# Patient Record
Sex: FEMALE | Race: WHITE | NOT HISPANIC OR LATINO | Employment: OTHER | URBAN - NONMETROPOLITAN AREA
[De-identification: names, ages, dates, MRNs, and addresses within clinical notes are randomized per-mention and may not be internally consistent; named-entity substitution may affect disease eponyms.]

---

## 2023-03-02 LAB
ANION GAP IN SER/PLAS: 12 MMOL/L (ref 10–20)
ANION GAP IN SER/PLAS: NORMAL
CALCIUM (MG/DL) IN SER/PLAS: 9.3 MG/DL (ref 8.6–10.3)
CALCIUM (MG/DL) IN SER/PLAS: NORMAL
CARBON DIOXIDE, TOTAL (MMOL/L) IN SER/PLAS: 26 MMOL/L (ref 21–32)
CARBON DIOXIDE, TOTAL (MMOL/L) IN SER/PLAS: NORMAL
CHLORIDE (MMOL/L) IN SER/PLAS: 104 MMOL/L (ref 98–107)
CHLORIDE (MMOL/L) IN SER/PLAS: NORMAL
CREATININE (MG/DL) IN SER/PLAS: 1.01 MG/DL (ref 0.5–1.05)
CREATININE (MG/DL) IN SER/PLAS: NORMAL
GFR FEMALE: 59 ML/MIN/1.73M2
GFR FEMALE: NORMAL
GFR MALE: NORMAL
GLUCOSE (MG/DL) IN SER/PLAS: 108 MG/DL (ref 74–99)
GLUCOSE (MG/DL) IN SER/PLAS: NORMAL
POTASSIUM (MMOL/L) IN SER/PLAS: 4.2 MMOL/L (ref 3.5–5.3)
POTASSIUM (MMOL/L) IN SER/PLAS: NORMAL
SODIUM (MMOL/L) IN SER/PLAS: 138 MMOL/L (ref 136–145)
SODIUM (MMOL/L) IN SER/PLAS: NORMAL
THYROTROPIN (MIU/L) IN SER/PLAS BY DETECTION LIMIT <= 0.05 MIU/L: 2.25 MIU/L (ref 0.44–3.98)
THYROTROPIN (MIU/L) IN SER/PLAS BY DETECTION LIMIT <= 0.05 MIU/L: NORMAL
THYROXINE (T4) FREE (NG/DL) IN SER/PLAS: 0.84 NG/DL (ref 0.61–1.12)
THYROXINE (T4) FREE (NG/DL) IN SER/PLAS: NORMAL
UREA NITROGEN (MG/DL) IN SER/PLAS: 25 MG/DL (ref 6–23)
UREA NITROGEN (MG/DL) IN SER/PLAS: NORMAL

## 2023-08-31 LAB
ALANINE AMINOTRANSFERASE (SGPT) (U/L) IN SER/PLAS: 30 U/L (ref 7–45)
ALBUMIN (G/DL) IN SER/PLAS: 4.3 G/DL (ref 3.4–5)
ALKALINE PHOSPHATASE (U/L) IN SER/PLAS: 110 U/L (ref 33–136)
ANION GAP IN SER/PLAS: 14 MMOL/L (ref 10–20)
ASPARTATE AMINOTRANSFERASE (SGOT) (U/L) IN SER/PLAS: 28 U/L (ref 9–39)
BILIRUBIN TOTAL (MG/DL) IN SER/PLAS: 0.4 MG/DL (ref 0–1.2)
CALCIUM (MG/DL) IN SER/PLAS: 9.3 MG/DL (ref 8.6–10.3)
CARBON DIOXIDE, TOTAL (MMOL/L) IN SER/PLAS: 24 MMOL/L (ref 21–32)
CHLORIDE (MMOL/L) IN SER/PLAS: 103 MMOL/L (ref 98–107)
CHOLESTEROL (MG/DL) IN SER/PLAS: 159 MG/DL (ref 0–199)
CHOLESTEROL IN HDL (MG/DL) IN SER/PLAS: 55 MG/DL
CHOLESTEROL/HDL RATIO: 2.9
CREATININE (MG/DL) IN SER/PLAS: 0.89 MG/DL (ref 0.5–1.05)
ERYTHROCYTE DISTRIBUTION WIDTH (RATIO) BY AUTOMATED COUNT: 16.6 % (ref 11.5–14.5)
ERYTHROCYTE MEAN CORPUSCULAR HEMOGLOBIN CONCENTRATION (G/DL) BY AUTOMATED: 30.9 G/DL (ref 32–36)
ERYTHROCYTE MEAN CORPUSCULAR VOLUME (FL) BY AUTOMATED COUNT: 87 FL (ref 80–100)
ERYTHROCYTES (10*6/UL) IN BLOOD BY AUTOMATED COUNT: 4.53 X10E12/L (ref 4–5.2)
ESTIMATED AVERAGE GLUCOSE FOR HBA1C: 131 MG/DL
GFR FEMALE: 68 ML/MIN/1.73M2
GLUCOSE (MG/DL) IN SER/PLAS: 105 MG/DL (ref 74–99)
HEMATOCRIT (%) IN BLOOD BY AUTOMATED COUNT: 39.5 % (ref 36–46)
HEMOGLOBIN (G/DL) IN BLOOD: 12.2 G/DL (ref 12–16)
HEMOGLOBIN A1C/HEMOGLOBIN TOTAL IN BLOOD: 6.2 %
LDL: 89 MG/DL (ref 0–99)
LEUKOCYTES (10*3/UL) IN BLOOD BY AUTOMATED COUNT: 8 X10E9/L (ref 4.4–11.3)
PLATELETS (10*3/UL) IN BLOOD AUTOMATED COUNT: 196 X10E9/L (ref 150–450)
POTASSIUM (MMOL/L) IN SER/PLAS: 4 MMOL/L (ref 3.5–5.3)
PROTEIN TOTAL: 7 G/DL (ref 6.4–8.2)
SODIUM (MMOL/L) IN SER/PLAS: 137 MMOL/L (ref 136–145)
THYROTROPIN (MIU/L) IN SER/PLAS BY DETECTION LIMIT <= 0.05 MIU/L: 4.14 MIU/L (ref 0.44–3.98)
THYROXINE (T4) FREE (NG/DL) IN SER/PLAS: 0.83 NG/DL (ref 0.61–1.12)
TRIGLYCERIDE (MG/DL) IN SER/PLAS: 74 MG/DL (ref 0–149)
UREA NITROGEN (MG/DL) IN SER/PLAS: 26 MG/DL (ref 6–23)
VLDL: 15 MG/DL (ref 0–40)

## 2023-09-01 PROBLEM — R09.81 NASAL CONGESTION: Status: ACTIVE | Noted: 2023-09-01

## 2023-09-01 PROBLEM — R21 SKIN RASH: Status: ACTIVE | Noted: 2023-09-01

## 2023-09-01 PROBLEM — R53.83 FATIGUE: Status: ACTIVE | Noted: 2023-09-01

## 2023-09-01 PROBLEM — R55 NEAR SYNCOPE: Status: ACTIVE | Noted: 2023-09-01

## 2023-09-01 PROBLEM — R10.13 DYSPEPSIA: Status: ACTIVE | Noted: 2023-09-01

## 2023-09-01 PROBLEM — E78.5 HYPERLIPIDEMIA: Status: ACTIVE | Noted: 2023-09-01

## 2023-09-01 PROBLEM — J04.0 LARYNGITIS: Status: ACTIVE | Noted: 2023-09-01

## 2023-09-01 PROBLEM — M51.37 DDD (DEGENERATIVE DISC DISEASE), LUMBOSACRAL: Status: ACTIVE | Noted: 2023-09-01

## 2023-09-01 PROBLEM — J01.40 ACUTE NON-RECURRENT PANSINUSITIS: Status: ACTIVE | Noted: 2023-09-01

## 2023-09-01 PROBLEM — R19.7 DIARRHEA: Status: ACTIVE | Noted: 2023-09-01

## 2023-09-01 PROBLEM — G47.33 OBSTRUCTIVE SLEEP APNEA, ADULT: Status: ACTIVE | Noted: 2023-09-01

## 2023-09-01 PROBLEM — N90.7 INCLUSION CYST OF VULVA: Status: ACTIVE | Noted: 2023-09-01

## 2023-09-01 PROBLEM — L71.9 ROSACEA: Status: ACTIVE | Noted: 2023-09-01

## 2023-09-01 PROBLEM — R42 DIZZINESS: Status: ACTIVE | Noted: 2023-09-01

## 2023-09-01 PROBLEM — R10.9 ABDOMINAL PAIN: Status: ACTIVE | Noted: 2023-09-01

## 2023-09-01 PROBLEM — I10 BENIGN ESSENTIAL HTN: Status: ACTIVE | Noted: 2023-09-01

## 2023-09-01 PROBLEM — Z86.39 H/O IRON DEFICIENCY: Status: ACTIVE | Noted: 2023-09-01

## 2023-09-01 PROBLEM — N64.59 BREAST THICKENING: Status: ACTIVE | Noted: 2023-09-01

## 2023-09-01 PROBLEM — F51.04 CHRONIC INSOMNIA: Status: ACTIVE | Noted: 2023-09-01

## 2023-09-01 PROBLEM — F41.9 ANXIETY: Status: ACTIVE | Noted: 2023-09-01

## 2023-09-01 PROBLEM — M51.379 DDD (DEGENERATIVE DISC DISEASE), LUMBOSACRAL: Status: ACTIVE | Noted: 2023-09-01

## 2023-09-01 PROBLEM — R05.9 COUGH: Status: ACTIVE | Noted: 2023-09-01

## 2023-09-01 PROBLEM — K21.9 GASTROESOPHAGEAL REFLUX DISEASE WITHOUT ESOPHAGITIS: Status: ACTIVE | Noted: 2023-09-01

## 2023-09-01 PROBLEM — B37.0 THRUSH: Status: ACTIVE | Noted: 2023-09-01

## 2023-09-01 PROBLEM — M16.0 PRIMARY OSTEOARTHRITIS OF BOTH HIPS: Status: ACTIVE | Noted: 2023-09-01

## 2023-09-01 PROBLEM — E66.9 OBESITY: Status: ACTIVE | Noted: 2023-09-01

## 2023-09-01 PROBLEM — N90.89 VULVAR LESION: Status: ACTIVE | Noted: 2023-09-01

## 2023-09-01 PROBLEM — M25.512 CHRONIC LEFT SHOULDER PAIN: Status: ACTIVE | Noted: 2023-09-01

## 2023-09-01 PROBLEM — G89.29 CHRONIC LEFT SHOULDER PAIN: Status: ACTIVE | Noted: 2023-09-01

## 2023-09-01 PROBLEM — I25.10 CORONARY ARTERY DISEASE INVOLVING NATIVE CORONARY ARTERY OF NATIVE HEART WITHOUT ANGINA PECTORIS: Status: ACTIVE | Noted: 2023-09-01

## 2023-09-01 RX ORDER — BUPROPION HYDROCHLORIDE 150 MG/1
1 TABLET ORAL DAILY
COMMUNITY
Start: 2022-08-29 | End: 2023-09-08 | Stop reason: ALTCHOICE

## 2023-09-01 RX ORDER — MOMETASONE FUROATE 1 MG/G
CREAM TOPICAL
COMMUNITY
Start: 2020-08-20

## 2023-09-01 RX ORDER — ALBUTEROL SULFATE 90 UG/1
2 AEROSOL, METERED RESPIRATORY (INHALATION) 4 TIMES DAILY
COMMUNITY
End: 2024-03-08 | Stop reason: SDUPTHER

## 2023-09-01 RX ORDER — FLUCONAZOLE 200 MG/1
1 TABLET ORAL DAILY
COMMUNITY
Start: 2022-10-11 | End: 2023-09-08 | Stop reason: ALTCHOICE

## 2023-09-01 RX ORDER — ATENOLOL 50 MG/1
1 TABLET ORAL DAILY
COMMUNITY
End: 2023-10-31

## 2023-09-01 RX ORDER — FLUTICASONE PROPIONATE 50 MCG
1 SPRAY, SUSPENSION (ML) NASAL DAILY
COMMUNITY
Start: 2022-01-31 | End: 2023-12-22 | Stop reason: SDUPTHER

## 2023-09-01 RX ORDER — OMEPRAZOLE 20 MG/1
1 CAPSULE, DELAYED RELEASE ORAL DAILY
COMMUNITY
Start: 2020-02-10 | End: 2023-10-31

## 2023-09-01 RX ORDER — SIMVASTATIN 40 MG/1
1 TABLET, FILM COATED ORAL NIGHTLY
COMMUNITY
Start: 2019-10-08 | End: 2023-10-31

## 2023-09-05 ENCOUNTER — APPOINTMENT (OUTPATIENT)
Dept: PRIMARY CARE | Facility: CLINIC | Age: 73
End: 2023-09-05
Payer: MEDICARE

## 2023-09-08 ENCOUNTER — OFFICE VISIT (OUTPATIENT)
Dept: PRIMARY CARE | Facility: CLINIC | Age: 73
End: 2023-09-08
Payer: MEDICARE

## 2023-09-08 VITALS
SYSTOLIC BLOOD PRESSURE: 124 MMHG | BODY MASS INDEX: 43.56 KG/M2 | DIASTOLIC BLOOD PRESSURE: 82 MMHG | OXYGEN SATURATION: 96 % | HEIGHT: 59 IN | HEART RATE: 67 BPM | WEIGHT: 216.1 LBS

## 2023-09-08 DIAGNOSIS — E66.01 MORBID (SEVERE) OBESITY DUE TO EXCESS CALORIES (MULTI): ICD-10-CM

## 2023-09-08 DIAGNOSIS — E78.2 MIXED HYPERLIPIDEMIA: ICD-10-CM

## 2023-09-08 DIAGNOSIS — I25.119 ATHEROSCLEROSIS OF NATIVE CORONARY ARTERY OF NATIVE HEART WITH ANGINA PECTORIS (CMS-HCC): ICD-10-CM

## 2023-09-08 DIAGNOSIS — R19.7 DIARRHEA, UNSPECIFIED TYPE: ICD-10-CM

## 2023-09-08 DIAGNOSIS — M51.37 DDD (DEGENERATIVE DISC DISEASE), LUMBOSACRAL: ICD-10-CM

## 2023-09-08 DIAGNOSIS — K21.9 GASTROESOPHAGEAL REFLUX DISEASE WITHOUT ESOPHAGITIS: ICD-10-CM

## 2023-09-08 DIAGNOSIS — R73.02 IGT (IMPAIRED GLUCOSE TOLERANCE): ICD-10-CM

## 2023-09-08 DIAGNOSIS — I10 BENIGN ESSENTIAL HTN: ICD-10-CM

## 2023-09-08 DIAGNOSIS — Z00.00 ROUTINE GENERAL MEDICAL EXAMINATION AT HEALTH CARE FACILITY: Primary | ICD-10-CM

## 2023-09-08 DIAGNOSIS — I25.10 CORONARY ARTERY DISEASE INVOLVING NATIVE CORONARY ARTERY OF NATIVE HEART WITHOUT ANGINA PECTORIS: ICD-10-CM

## 2023-09-08 PROCEDURE — 3079F DIAST BP 80-89 MM HG: CPT | Performed by: FAMILY MEDICINE

## 2023-09-08 PROCEDURE — 1159F MED LIST DOCD IN RCRD: CPT | Performed by: FAMILY MEDICINE

## 2023-09-08 PROCEDURE — 1126F AMNT PAIN NOTED NONE PRSNT: CPT | Performed by: FAMILY MEDICINE

## 2023-09-08 PROCEDURE — 3074F SYST BP LT 130 MM HG: CPT | Performed by: FAMILY MEDICINE

## 2023-09-08 PROCEDURE — 1170F FXNL STATUS ASSESSED: CPT | Performed by: FAMILY MEDICINE

## 2023-09-08 PROCEDURE — 1036F TOBACCO NON-USER: CPT | Performed by: FAMILY MEDICINE

## 2023-09-08 PROCEDURE — G0439 PPPS, SUBSEQ VISIT: HCPCS | Performed by: FAMILY MEDICINE

## 2023-09-08 PROCEDURE — 1160F RVW MEDS BY RX/DR IN RCRD: CPT | Performed by: FAMILY MEDICINE

## 2023-09-08 PROCEDURE — 99214 OFFICE O/P EST MOD 30 MIN: CPT | Performed by: FAMILY MEDICINE

## 2023-09-08 RX ORDER — MINERAL OIL
1 ENEMA (ML) RECTAL DAILY
COMMUNITY

## 2023-09-08 RX ORDER — ZINC GLUCONATE 100 MG
1 TABLET ORAL DAILY
COMMUNITY

## 2023-09-08 RX ORDER — FLUOXETINE 10 MG/1
CAPSULE ORAL
COMMUNITY
Start: 2023-08-30

## 2023-09-08 RX ORDER — FLUOXETINE HYDROCHLORIDE 20 MG/1
1 CAPSULE ORAL DAILY
COMMUNITY
Start: 2023-03-02

## 2023-09-08 RX ORDER — FLUOXETINE HYDROCHLORIDE 40 MG/1
CAPSULE ORAL
COMMUNITY
End: 2023-10-31

## 2023-09-08 RX ORDER — LANOLIN ALCOHOL/MO/W.PET/CERES
100 CREAM (GRAM) TOPICAL DAILY
COMMUNITY

## 2023-09-08 RX ORDER — DIPHENOXYLATE HYDROCHLORIDE AND ATROPINE SULFATE 2.5; .025 MG/1; MG/1
1 TABLET ORAL 4 TIMES DAILY PRN
Qty: 30 TABLET | Refills: 0 | Status: SHIPPED | OUTPATIENT
Start: 2023-09-08 | End: 2023-10-08

## 2023-09-08 ASSESSMENT — ENCOUNTER SYMPTOMS
OCCASIONAL FEELINGS OF UNSTEADINESS: 0
LOSS OF SENSATION IN FEET: 0
DEPRESSION: 0

## 2023-09-08 ASSESSMENT — ACTIVITIES OF DAILY LIVING (ADL)
DRESSING: INDEPENDENT
DOING_HOUSEWORK: INDEPENDENT
MANAGING_FINANCES: INDEPENDENT
BATHING: INDEPENDENT
GROCERY_SHOPPING: INDEPENDENT
TAKING_MEDICATION: INDEPENDENT

## 2023-09-08 NOTE — PROGRESS NOTES
"Subjective   Reason for Visit: Katrin Garcia is an 73 y.o. female here for a Medicare Wellness visit.     Past Medical, Surgical, and Family History reviewed and updated in chart.    Reviewed all medications by prescribing practitioner or clinical pharmacist (such as prescriptions, OTCs, herbal therapies and supplements) and documented in the medical record.    HPI  Since the last office visit there have been no interval operations, hospitalizations, important illnesses or injuries.  Struggling woth neck and back, doing better woth brace and exercises.  Now knee flared  HTN-Takes and tolerates meds without side effects. No alcohol. no tobacco. no exercise. low salt.  Reviewed recommendation for 150 minutes of exercise per week including 2 days of weight training if over age 50  Coronary artery disease-denies angina  Alb 4x month  IGT continue to monitor  On 70mg nprozac and helpful for anx and dep, mary    Hyperlipidemia- is on a statin and a prudent diet.  GERD-Takes PPI daily with no breakthrough symptoms.  Reviewed dietary, caffeine, tobacco, alcohol, and NSAID avoidance. No dyspepsia, dysphagia, reflux, melena, or abdominal pain.  Diarrhea-states she would like to have some Lomotil for as needed use [place]ARRS report was checked no contract required  Patient Care Team:  Jorge Sesay MD as PCP - General  Jorge Sesay MD as PCP - [employer] PCP     Review of Systems  General-no fatigue weight to within 10 pounds  ENT no problems with vision swallowing  Cardiac no chest pains palpitations change in exercise tolerance or capacity  Pulmonary no cough shortness of breath  GI no heartburn or abdominal pain  Musculoskeletal no joint pains  Objective   Vitals:  /82   Pulse 67   Ht 1.499 m (4' 11\")   Wt 98 kg (216 lb 1.6 oz)   SpO2 96%   BMI 43.65 kg/m²       Physical Exam  General:  Alert, No acute distress. Appears stated age  Eye:  Pupils are equal, round and reactive to light, " Extraocular movements are intact, Normal conjunctiva.    Neck:  Supple, Non-tender, No carotid bruit, No jugular venous distention, No lymphadenopathy, No thyromegaly.    Respiratory:  Lungs are clear to auscultation, Respirations are non-labored, Breath sounds are equal.    Cardiovascular:  Normal rate, Regular rhythm, No murmur.    Gastrointestinal:  Soft, Non-tender, No organomegaly. No solid or pulsatile mass  Integumentary:  Warm, Dry. No concerning lesions on exposed areas  Neurologic:  Alert, Oriented.  Gross and fine motor intact, CN 2-12 intact  Psychiatric:  Cooperative, Appropriate mood & affect.  Assessment/Plan   Problem List Items Addressed This Visit       Benign essential HTN    Relevant Orders    CBC    Comprehensive Metabolic Panel    Follow Up In Primary Care    Coronary artery disease involving native coronary artery of native heart without angina pectoris    Relevant Orders    Follow Up In Primary Care    DDD (degenerative disc disease), lumbosacral    Diarrhea    Relevant Medications    diphenoxylate-atropine (Lomotil) 2.5-0.025 mg tablet    Gastroesophageal reflux disease without esophagitis    Hyperlipidemia    Relevant Orders    Follow Up In Primary Care     Other Visit Diagnoses       Routine general medical examination at health care facility    -  Primary    IGT (impaired glucose tolerance)        Relevant Orders    Hemoglobin A1C

## 2023-10-31 DIAGNOSIS — F41.9 ANXIETY: Primary | ICD-10-CM

## 2023-10-31 DIAGNOSIS — K21.9 GASTROESOPHAGEAL REFLUX DISEASE WITHOUT ESOPHAGITIS: ICD-10-CM

## 2023-10-31 DIAGNOSIS — E78.2 MIXED HYPERLIPIDEMIA: ICD-10-CM

## 2023-10-31 DIAGNOSIS — I10 BENIGN ESSENTIAL HTN: ICD-10-CM

## 2023-10-31 RX ORDER — ATENOLOL 50 MG/1
50 TABLET ORAL DAILY
Qty: 90 TABLET | Refills: 2 | Status: SHIPPED | OUTPATIENT
Start: 2023-10-31

## 2023-10-31 RX ORDER — OMEPRAZOLE 20 MG/1
20 CAPSULE, DELAYED RELEASE ORAL DAILY
Qty: 90 CAPSULE | Refills: 2 | Status: SHIPPED | OUTPATIENT
Start: 2023-10-31

## 2023-10-31 RX ORDER — SIMVASTATIN 40 MG/1
40 TABLET, FILM COATED ORAL NIGHTLY
Qty: 90 TABLET | Refills: 2 | Status: SHIPPED | OUTPATIENT
Start: 2023-10-31

## 2023-10-31 RX ORDER — FLUOXETINE HYDROCHLORIDE 40 MG/1
40 CAPSULE ORAL DAILY
Qty: 90 CAPSULE | Refills: 2 | Status: SHIPPED | OUTPATIENT
Start: 2023-10-31

## 2023-12-22 ENCOUNTER — APPOINTMENT (OUTPATIENT)
Dept: PRIMARY CARE | Facility: CLINIC | Age: 73
End: 2023-12-22
Payer: MEDICARE

## 2023-12-22 DIAGNOSIS — R09.81 NASAL CONGESTION: ICD-10-CM

## 2023-12-22 RX ORDER — FLUTICASONE PROPIONATE 50 MCG
1 SPRAY, SUSPENSION (ML) NASAL DAILY
Qty: 16 G | Refills: 3 | Status: SHIPPED | OUTPATIENT
Start: 2023-12-22 | End: 2024-12-21

## 2023-12-27 ENCOUNTER — OFFICE VISIT (OUTPATIENT)
Dept: URGENT CARE | Facility: CLINIC | Age: 73
End: 2023-12-27
Payer: MEDICARE

## 2023-12-27 VITALS
OXYGEN SATURATION: 95 % | BODY MASS INDEX: 42.33 KG/M2 | DIASTOLIC BLOOD PRESSURE: 84 MMHG | TEMPERATURE: 98.3 F | HEIGHT: 59 IN | RESPIRATION RATE: 14 BRPM | WEIGHT: 210 LBS | SYSTOLIC BLOOD PRESSURE: 129 MMHG | HEART RATE: 64 BPM

## 2023-12-27 DIAGNOSIS — J20.9 ACUTE BRONCHITIS, UNSPECIFIED ORGANISM: Primary | ICD-10-CM

## 2023-12-27 PROCEDURE — 99213 OFFICE O/P EST LOW 20 MIN: CPT | Mod: 25 | Performed by: NURSE PRACTITIONER

## 2023-12-27 RX ORDER — PREDNISONE 10 MG/1
TABLET ORAL
Qty: 21 TABLET | Refills: 0 | Status: SHIPPED | OUTPATIENT
Start: 2023-12-27 | End: 2024-01-01

## 2023-12-27 RX ORDER — AZITHROMYCIN 250 MG/1
TABLET, FILM COATED ORAL
Qty: 6 TABLET | Refills: 0 | Status: SHIPPED | OUTPATIENT
Start: 2023-12-27 | End: 2024-01-01

## 2023-12-27 RX ORDER — ALBUTEROL SULFATE 90 UG/1
2 AEROSOL, METERED RESPIRATORY (INHALATION) EVERY 6 HOURS PRN
Qty: 18 G | Refills: 0 | Status: SHIPPED | OUTPATIENT
Start: 2023-12-27 | End: 2024-12-26

## 2023-12-27 NOTE — PROGRESS NOTES
73 y.o. female presents for evaluation of URI.  Symptoms including cough, congestion, body aches, malaise, and headache have been present for several days and refractory to OTC meds.  No fever, chills, nausea, vomiting, abdominal pain, CP, or SOB.  No exacerbating factors. Tested positive for COVID 1 week ago and retested negative a few days ago.      Vitals:    12/27/23 1132   BP: 129/84   Pulse: 64   Resp: 14   Temp: 36.8 °C (98.3 °F)   SpO2: 95%       Allergies   Allergen Reactions    Bupropion Hcl Other     DEPRESSION    Codeine Anaphylaxis    Adhesive Tape-Silicones Swelling    Atorvastatin Other     MYALGIA    Camphor-Eucalyptus Oil-Menthol Unknown    Clindamycin Unknown    Erythromycin Base Unknown    Moxifloxacin Other     VOMITING    Nut - Unspecified Unknown    Pineapple Swelling    Procaine Unknown    Tetanus Toxoid Swelling    Tetanus Toxoid, Adsorbed Other    Latex Other and Rash       Medication Documentation Review Audit       Reviewed by Lakshmi Joy MA (Medical Assistant) on 12/27/23 at 1134      Medication Order Taking? Sig Documenting Provider Last Dose Status   albuterol 90 mcg/actuation inhaler [id number] Yes Inhale 2 puffs 4 times a day. Historical Provider, MD Taking Active   atenolol (Tenormin) 50 mg tablet [id number] Yes take 1 tablet by mouth once daily Jorge Sesay MD Taking Active   cholecalciferol, vitamin D3, (VITAMIN D3 ORAL) [id number] Yes Take by mouth. Historical Provider, MD Taking Active   cyanocobalamin (Vitamin B-12) 1,000 mcg tablet [id number] Yes Take 100 mcg by mouth once daily. Historical Provider, MD Taking Active   fexofenadine (Allegra Allergy) 180 mg tablet [id number] Yes Take 1 tablet (180 mg) by mouth once daily. Historical Provider, MD Taking Active   FLUoxetine (PROzac) 10 mg capsule [id number] Yes take 1 capsule by mouth once daily take with 40 milligram and 20 ...  (REFER TO PRESCRIPTION NOTES). Historical Provider, MD Taking Active   FLUoxetine (PROzac) 20 mg  capsule [id number] Yes Take 1 capsule (20 mg) by mouth once daily. Historical Provider, MD Taking Active   FLUoxetine (PROzac) 40 mg capsule [id number] Yes take 1 capsule by mouth once daily Jorge Sesay MD Taking Active     Discontinued 12/22/23 1237   fluticasone (Flonase) 50 mcg/actuation nasal spray [id number] Yes Administer 1 spray into each nostril once daily. Jorge Sesay MD Taking Active   IODINE ORAL [id number] Yes Take by mouth. Historical Provider, MD Taking Active   mometasone (Elocon) 0.1 % cream [id number] Yes Apply topically once daily. APPLY SPARINGLY TO AFFECTED AREA(S) ONCE DAILY Historical Provider, MD Taking Active   omeprazole (PriLOSEC) 20 mg DR capsule [id number] Yes take 1 capsule by mouth once daily Jorge Sesay MD Taking Active   phytonadione, vit K1, (vitamin k) 100 mcg tablet [id number] Yes Take 1 tablet (100 mcg) by mouth once daily. Historical Provider, MD Taking Active   simvastatin (Zocor) 40 mg tablet [id number] Yes take 1 tablet by mouth at bedtime Jorge Sesay MD Taking Active                    Past Medical History:   Diagnosis Date    Encounter for gynecological examination (general) (routine) without abnormal findings     Women's annual routine gynecological examination    Encounter for screening for malignant neoplasm of vagina 08/03/2020    Vaginal Pap smear    Other conditions influencing health status     Menarche    Personal history of other complications of pregnancy, childbirth and the puerperium     History of ectopic pregnancy    Personal history of other diseases of the nervous system and sense organs     History of sleep apnea       Past Surgical History:   Procedure Laterality Date    OTHER SURGICAL HISTORY  01/06/2020    Kidney surgery    OTHER SURGICAL HISTORY  01/06/2020    Hand surgery    OTHER SURGICAL HISTORY  01/06/2020    Colonoscopy    OTHER SURGICAL HISTORY  01/06/2020    Ankle surgery    OTHER SURGICAL HISTORY  01/06/2020    Cholecystectomy     OTHER SURGICAL HISTORY  01/06/2020    Appendectomy    OTHER SURGICAL HISTORY  01/06/2020    Tonsillectomy    OTHER SURGICAL HISTORY  08/03/2020    Hysterectomy       ROS  See HPI    Physical Exam  Vitals and nursing note reviewed.   Constitutional:       Appearance: She is ill-appearing (mildly).   HENT:      Head: Normocephalic and atraumatic.      Right Ear: Tympanic membrane and ear canal normal.      Left Ear: Tympanic membrane and ear canal normal.      Nose: Congestion present.      Mouth/Throat:      Mouth: Mucous membranes are moist.      Pharynx: Oropharynx is clear.   Eyes:      Extraocular Movements: Extraocular movements intact.      Conjunctiva/sclera: Conjunctivae normal.      Pupils: Pupils are equal, round, and reactive to light.   Cardiovascular:      Rate and Rhythm: Normal rate and regular rhythm.      Heart sounds: Normal heart sounds.   Pulmonary:      Effort: Pulmonary effort is normal.      Breath sounds: Normal breath sounds.      Comments: Dry cough during exam  Lymphadenopathy:      Cervical: No cervical adenopathy.   Skin:     General: Skin is warm and dry.      Capillary Refill: Capillary refill takes less than 2 seconds.   Neurological:      General: No focal deficit present.      Mental Status: She is alert and oriented to person, place, and time.   Psychiatric:         Mood and Affect: Mood normal.         Behavior: Behavior normal.         Assessment/Plan/MDM  Katrin was seen today for uri.  Diagnoses and all orders for this visit:  Acute bronchitis, unspecified organism (Primary)  -     azithromycin (Zithromax Z-Bobby) 250 mg tablet; Take 2 tablets (500 mg) on  Day 1,  followed by 1 tablet (250 mg) once daily on Days 2 through 5.  -     predniSONE (Deltasone) 10 mg tablet; Take 6 tablets (60 mg) by mouth once daily for 1 day, THEN 5 tablets (50 mg) once daily for 1 day, THEN 4 tablets (40 mg) once daily for 1 day, THEN 3 tablets (30 mg) once daily for 1 day, THEN 2 tablets (20 mg)  once daily for 1 day, THEN 1 tablet (10 mg) once daily for 1 day.  -     albuterol 90 mcg/actuation inhaler; Inhale 2 puffs every 6 hours if needed for wheezing.    Patient's clinical presentation is otherwise unremarkable at this time. Patient is discharged with instructions to follow-up with primary care or seek emergency medical attention for worsening symptoms or any new concerns.    Dominic Ross CNP  [facility]  [phone]   Father/EMS

## 2024-02-08 ENCOUNTER — OFFICE VISIT (OUTPATIENT)
Dept: OBSTETRICS AND GYNECOLOGY | Facility: CLINIC | Age: 74
End: 2024-02-08
Payer: MEDICARE

## 2024-02-08 VITALS
SYSTOLIC BLOOD PRESSURE: 128 MMHG | BODY MASS INDEX: 45.93 KG/M2 | HEIGHT: 58 IN | WEIGHT: 218.8 LBS | DIASTOLIC BLOOD PRESSURE: 74 MMHG

## 2024-02-08 DIAGNOSIS — Z12.31 ENCOUNTER FOR SCREENING MAMMOGRAM FOR MALIGNANT NEOPLASM OF BREAST: ICD-10-CM

## 2024-02-08 PROCEDURE — 1126F AMNT PAIN NOTED NONE PRSNT: CPT | Performed by: REGISTERED NURSE

## 2024-02-08 PROCEDURE — 99397 PER PM REEVAL EST PAT 65+ YR: CPT | Performed by: REGISTERED NURSE

## 2024-02-08 PROCEDURE — 3078F DIAST BP <80 MM HG: CPT | Performed by: REGISTERED NURSE

## 2024-02-08 PROCEDURE — 3074F SYST BP LT 130 MM HG: CPT | Performed by: REGISTERED NURSE

## 2024-02-08 PROCEDURE — 1159F MED LIST DOCD IN RCRD: CPT | Performed by: REGISTERED NURSE

## 2024-02-08 PROCEDURE — 1036F TOBACCO NON-USER: CPT | Performed by: REGISTERED NURSE

## 2024-02-08 ASSESSMENT — ENCOUNTER SYMPTOMS
PSYCHIATRIC NEGATIVE: 1
CONSTITUTIONAL NEGATIVE: 1

## 2024-02-08 NOTE — PROGRESS NOTES
Subjective   Patient ID: Katrin Garcia is a 73 y.o. female who presents for Gynecologic Exam.  Gynecologic Exam    Pt. Presents for annual exam. 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 Pt. Does regular vulvar self-exam. Due for mammogram. Reports feeling much better due to decreased anxiety symptoms with increased dose of fluoxetine per PCP. Denies any complaints or concerns. 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     Review of Systems   Constitutional: Negative.    Genitourinary: Negative.    Psychiatric/Behavioral: Negative.         Objective   Physical Exam  Constitutional:       Appearance: Normal appearance.   Pulmonary:      Effort: Pulmonary effort is normal.   Neurological:      General: No focal deficit present.      Mental Status: She is alert and oriented to person, place, and time.   Psychiatric:         Mood and Affect: Mood normal.         Behavior: Behavior normal.         Assessment/Plan        Schedule mammogram  RTO annual exam and PRN    Gisela Sneed, APRN-CNM, APRN-CNP, DNP 02/08/24 10:26 AM

## 2024-02-13 ENCOUNTER — HOSPITAL ENCOUNTER (OUTPATIENT)
Dept: RADIOLOGY | Facility: HOSPITAL | Age: 74
Discharge: HOME | End: 2024-02-13
Payer: MEDICARE

## 2024-02-13 DIAGNOSIS — Z12.31 ENCOUNTER FOR SCREENING MAMMOGRAM FOR MALIGNANT NEOPLASM OF BREAST: ICD-10-CM

## 2024-02-13 PROCEDURE — 77063 BREAST TOMOSYNTHESIS BI: CPT

## 2024-02-13 PROCEDURE — 77063 BREAST TOMOSYNTHESIS BI: CPT | Performed by: RADIOLOGY

## 2024-02-13 PROCEDURE — 77067 SCR MAMMO BI INCL CAD: CPT | Performed by: RADIOLOGY

## 2024-02-19 ENCOUNTER — OFFICE VISIT (OUTPATIENT)
Dept: URGENT CARE | Facility: CLINIC | Age: 74
End: 2024-02-19
Payer: MEDICARE

## 2024-02-19 VITALS
DIASTOLIC BLOOD PRESSURE: 71 MMHG | SYSTOLIC BLOOD PRESSURE: 127 MMHG | WEIGHT: 212 LBS | BODY MASS INDEX: 44.5 KG/M2 | HEIGHT: 58 IN | RESPIRATION RATE: 16 BRPM | OXYGEN SATURATION: 97 % | HEART RATE: 67 BPM | TEMPERATURE: 98.6 F

## 2024-02-19 DIAGNOSIS — J01.00 ACUTE NON-RECURRENT MAXILLARY SINUSITIS: Primary | ICD-10-CM

## 2024-02-19 PROCEDURE — 99213 OFFICE O/P EST LOW 20 MIN: CPT

## 2024-02-19 RX ORDER — TIZANIDINE 4 MG/1
4 TABLET ORAL EVERY 8 HOURS PRN
Qty: 30 TABLET | Refills: 0 | Status: SHIPPED | OUTPATIENT
Start: 2024-02-19 | End: 2024-02-19 | Stop reason: ENTERED-IN-ERROR

## 2024-02-19 RX ORDER — AZITHROMYCIN 250 MG/1
TABLET, FILM COATED ORAL
Qty: 6 TABLET | Refills: 0 | Status: SHIPPED | OUTPATIENT
Start: 2024-02-19 | End: 2024-02-24

## 2024-02-19 RX ORDER — DICLOFENAC SODIUM 10 MG/G
4 GEL TOPICAL 4 TIMES DAILY PRN
Qty: 100 G | Refills: 0 | Status: SHIPPED | OUTPATIENT
Start: 2024-02-19 | End: 2024-02-19 | Stop reason: ENTERED-IN-ERROR

## 2024-02-19 RX ORDER — METHYLPREDNISOLONE 4 MG/1
TABLET ORAL
Qty: 21 TABLET | Refills: 0 | Status: SHIPPED | OUTPATIENT
Start: 2024-02-19 | End: 2024-03-08 | Stop reason: ALTCHOICE

## 2024-02-19 RX ORDER — PREDNISONE 10 MG/1
TABLET ORAL
Qty: 21 TABLET | Refills: 0 | Status: SHIPPED | OUTPATIENT
Start: 2024-02-19 | End: 2024-02-19 | Stop reason: ENTERED-IN-ERROR

## 2024-02-19 NOTE — PROGRESS NOTES
73 y.o. female presents for evaluation of URI.  Symptoms including cough, bilateral ear pressure, nasal congestion, body aches, malaise, and headache have been present for 5-6 days and refractory to OTC meds.  No fever, chills, nausea, vomiting, abdominal pain, CP, or SOB.  No exacerbating factors. No known COVID 19/flu exposure.        Vitals:    02/19/24 1459   BP: 127/71   Pulse: 67   Resp: 16   Temp: 37 °C (98.6 °F)   SpO2: 97%       Allergies   Allergen Reactions    Bupropion Hcl Other     DEPRESSION    Codeine Anaphylaxis    Adhesive Tape-Silicones Swelling    Atorvastatin Other     MYALGIA    Bupropion Respiratory depression and Unknown    Camphor-Eucalyptus Oil-Menthol Unknown    Clindamycin Unknown    Erythromycin Base Unknown    Moxifloxacin Other     VOMITING    Nut - Unspecified Unknown    Pineapple Swelling    Procaine Unknown    Tetanus Toxoid Swelling    Tetanus Toxoid, Adsorbed Other    Latex Other and Rash       Medication Documentation Review Audit       Reviewed by Minda Sin MA (Medical Assistant) on 02/19/24 at 1459      Medication Order Taking? Sig Documenting Provider Last Dose Status   albuterol 90 mcg/actuation inhaler [id number] Yes Inhale 2 puffs 4 times a day. Historical Provider, MD Taking Active   albuterol 90 mcg/actuation inhaler [id number] Yes Inhale 2 puffs every 6 hours if needed for wheezing. EVIE Veliz-CNP Taking Active   atenolol (Tenormin) 50 mg tablet [id number] Yes take 1 tablet by mouth once daily Jorge Sesay MD Taking Active   cholecalciferol, vitamin D3, (VITAMIN D3 ORAL) [id number] Yes Take by mouth. Historical Provider, MD Taking Active   cyanocobalamin (Vitamin B-12) 1,000 mcg tablet [id number] Yes Take 100 mcg by mouth once daily. Historical Provider, MD Taking Active     Discontinued 02/19/24 1458   fexofenadine (Allegra Allergy) 180 mg tablet [id number] Yes Take 1 tablet (180 mg) by mouth once daily. Historical Provider, MD Taking Active    FLUoxetine (PROzac) 10 mg capsule [id number] Yes take 1 capsule by mouth once daily take with 40 milligram and 20 ...  (REFER TO PRESCRIPTION NOTES). Historical Provider, MD Taking Active   FLUoxetine (PROzac) 20 mg capsule [id number] Yes Take 1 capsule (20 mg) by mouth once daily. Historical Provider, MD Taking Active   FLUoxetine (PROzac) 40 mg capsule [id number] Yes take 1 capsule by mouth once daily Jorge Sesay MD Taking Active   fluticasone (Flonase) 50 mcg/actuation nasal spray [id number] Yes Administer 1 spray into each nostril once daily. Jorge Sesay MD Taking Active   IODINE ORAL [id number] Yes Take by mouth. Historical Provider, MD Taking Active   mometasone (Elocon) 0.1 % cream [id number] Yes Apply topically once daily. APPLY SPARINGLY TO AFFECTED AREA(S) ONCE DAILY Historical Provider, MD Taking Active   omeprazole (PriLOSEC) 20 mg DR capsule [id number] Yes take 1 capsule by mouth once daily Jorge Sesay MD Taking Active   phytonadione, vit K1, (vitamin k) 100 mcg tablet [id number] Yes Take 1 tablet (100 mcg) by mouth once daily. Historical Provider, MD Taking Active     Discontinued 02/19/24 1458   simvastatin (Zocor) 40 mg tablet [id number] Yes take 1 tablet by mouth at bedtime Jorge Sesay MD Taking Active     Discontinued 02/19/24 1458                    Past Medical History:   Diagnosis Date    Encounter for gynecological examination (general) (routine) without abnormal findings 01/26/2023    ASC-US HPV -    H/O mammogram 02/01/2023    Other conditions influencing health status     Menarche    Personal history of other complications of pregnancy, childbirth and the puerperium     History of ectopic pregnancy    Personal history of other diseases of the nervous system and sense organs     History of sleep apnea       Past Surgical History:   Procedure Laterality Date    HYSTERECTOMY  1982    OTHER SURGICAL HISTORY  01/06/2020    Kidney surgery    OTHER SURGICAL HISTORY  01/06/2020     Hand surgery    OTHER SURGICAL HISTORY  01/06/2020    Colonoscopy    OTHER SURGICAL HISTORY  01/06/2020    Ankle surgery    OTHER SURGICAL HISTORY  01/06/2020    Cholecystectomy    OTHER SURGICAL HISTORY  01/06/2020    Appendectomy    OTHER SURGICAL HISTORY  01/06/2020    Tonsillectomy       ROS  See HPI    Physical Exam  Vitals and nursing note reviewed.   Constitutional:       General: She is not in acute distress.     Appearance: Normal appearance. She is normal weight. She is not ill-appearing or toxic-appearing.   HENT:      Head: Normocephalic and atraumatic.      Right Ear: Tympanic membrane and ear canal normal.      Left Ear: Tympanic membrane and ear canal normal.      Nose: Congestion present.      Mouth/Throat:      Mouth: Mucous membranes are moist.      Pharynx: Oropharynx is clear.   Eyes:      Extraocular Movements: Extraocular movements intact.      Conjunctiva/sclera: Conjunctivae normal.      Pupils: Pupils are equal, round, and reactive to light.   Cardiovascular:      Rate and Rhythm: Normal rate and regular rhythm.   Pulmonary:      Effort: Pulmonary effort is normal.      Breath sounds: Normal breath sounds.      Comments: Dry cough  Lymphadenopathy:      Cervical: Cervical adenopathy present.   Skin:     General: Skin is warm and dry.      Capillary Refill: Capillary refill takes less than 2 seconds.   Neurological:      General: No focal deficit present.      Mental Status: She is alert and oriented to person, place, and time.   Psychiatric:         Mood and Affect: Mood normal.         Behavior: Behavior normal.         Assessment/Plan/MDM  Katrin was seen today for flu symptoms.  Diagnoses and all orders for this visit:  Acute non-recurrent maxillary sinusitis (Primary)  -     azithromycin (Zithromax Z-Bobby) 250 mg tablet; Take 2 tablets (500 mg) on  Day 1,  followed by 1 tablet (250 mg) once daily on Days 2 through 5.  -     methylPREDNISolone (Medrol Dospak) 4 mg tablets; Take as  directed on package.  Other orders  -     Discontinue: predniSONE (Deltasone) 10 mg tablet; Take 6 tablets (60 mg) by mouth once daily for 1 day, THEN 5 tablets (50 mg) once daily for 1 day, THEN 4 tablets (40 mg) once daily for 1 day, THEN 3 tablets (30 mg) once daily for 1 day, THEN 2 tablets (20 mg) once daily for 1 day, THEN 1 tablet (10 mg) once daily for 1 day.  -     Discontinue: tiZANidine (Zanaflex) 4 mg tablet; Take 1 tablet (4 mg) by mouth every 8 hours if needed for muscle spasms for up to 10 days.  -     Discontinue: diclofenac sodium (Voltaren) 1 % gel; Apply 4.5 inches (4 g) topically 4 times a day as needed (pain).    Encouraged pt to continue otc cold remedies PRN, push by mouth fluids and rest. Patient's clinical presentation is otherwise unremarkable at this time. Patient is discharged with instructions to follow-up with primary care or seek emergency medical attention for worsening symptoms or any new concerns.    Dominic Ross, CNP  [facility]  [phone]   English

## 2024-03-01 ENCOUNTER — LAB (OUTPATIENT)
Dept: LAB | Facility: LAB | Age: 74
End: 2024-03-01
Payer: MEDICARE

## 2024-03-01 DIAGNOSIS — R73.02 IGT (IMPAIRED GLUCOSE TOLERANCE): ICD-10-CM

## 2024-03-01 DIAGNOSIS — I10 BENIGN ESSENTIAL HTN: ICD-10-CM

## 2024-03-01 DIAGNOSIS — Z79.899 OTHER LONG TERM (CURRENT) DRUG THERAPY: ICD-10-CM

## 2024-03-01 DIAGNOSIS — E55.9 VITAMIN D DEFICIENCY, UNSPECIFIED: ICD-10-CM

## 2024-03-01 DIAGNOSIS — E73.0 CONGENITAL LACTASE DEFICIENCY: ICD-10-CM

## 2024-03-01 DIAGNOSIS — E03.9 HYPOTHYROIDISM, UNSPECIFIED: Primary | ICD-10-CM

## 2024-03-01 LAB
25(OH)D3 SERPL-MCNC: 30 NG/ML (ref 30–100)
ALBUMIN SERPL BCP-MCNC: 4.2 G/DL (ref 3.4–5)
ALP SERPL-CCNC: 94 U/L (ref 33–136)
ALT SERPL W P-5'-P-CCNC: 25 U/L (ref 7–45)
ANION GAP SERPL CALC-SCNC: 12 MMOL/L (ref 10–20)
AST SERPL W P-5'-P-CCNC: 19 U/L (ref 9–39)
BILIRUB SERPL-MCNC: 0.4 MG/DL (ref 0–1.2)
BUN SERPL-MCNC: 27 MG/DL (ref 6–23)
CALCIUM SERPL-MCNC: 8.9 MG/DL (ref 8.6–10.3)
CHLORIDE SERPL-SCNC: 104 MMOL/L (ref 98–107)
CO2 SERPL-SCNC: 24 MMOL/L (ref 21–32)
CREAT SERPL-MCNC: 0.81 MG/DL (ref 0.5–1.05)
EGFRCR SERPLBLD CKD-EPI 2021: 77 ML/MIN/1.73M*2
ERYTHROCYTE [DISTWIDTH] IN BLOOD BY AUTOMATED COUNT: 16.2 % (ref 11.5–14.5)
EST. AVERAGE GLUCOSE BLD GHB EST-MCNC: 128 MG/DL
GLUCOSE SERPL-MCNC: 106 MG/DL (ref 74–99)
HBA1C MFR BLD: 6.1 %
HCT VFR BLD AUTO: 39.2 % (ref 36–46)
HGB BLD-MCNC: 12.8 G/DL (ref 12–16)
MCH RBC QN AUTO: 28 PG (ref 26–34)
MCHC RBC AUTO-ENTMCNC: 32.7 G/DL (ref 32–36)
MCV RBC AUTO: 86 FL (ref 80–100)
NRBC BLD-RTO: 0 /100 WBCS (ref 0–0)
PLATELET # BLD AUTO: 232 X10*3/UL (ref 150–450)
POTASSIUM SERPL-SCNC: 4 MMOL/L (ref 3.5–5.3)
PROT SERPL-MCNC: 6.5 G/DL (ref 6.4–8.2)
RBC # BLD AUTO: 4.57 X10*6/UL (ref 4–5.2)
SODIUM SERPL-SCNC: 136 MMOL/L (ref 136–145)
TSH SERPL-ACNC: 1.91 MIU/L (ref 0.44–3.98)
WBC # BLD AUTO: 8.9 X10*3/UL (ref 4.4–11.3)

## 2024-03-01 PROCEDURE — 83036 HEMOGLOBIN GLYCOSYLATED A1C: CPT

## 2024-03-01 PROCEDURE — 36415 COLL VENOUS BLD VENIPUNCTURE: CPT

## 2024-03-01 PROCEDURE — 84443 ASSAY THYROID STIM HORMONE: CPT

## 2024-03-01 PROCEDURE — 80053 COMPREHEN METABOLIC PANEL: CPT

## 2024-03-01 PROCEDURE — 82306 VITAMIN D 25 HYDROXY: CPT

## 2024-03-01 PROCEDURE — 85027 COMPLETE CBC AUTOMATED: CPT

## 2024-03-08 ENCOUNTER — OFFICE VISIT (OUTPATIENT)
Dept: PRIMARY CARE | Facility: CLINIC | Age: 74
End: 2024-03-08
Payer: MEDICARE

## 2024-03-08 VITALS
WEIGHT: 219.4 LBS | BODY MASS INDEX: 46.05 KG/M2 | HEART RATE: 65 BPM | DIASTOLIC BLOOD PRESSURE: 78 MMHG | OXYGEN SATURATION: 95 % | SYSTOLIC BLOOD PRESSURE: 122 MMHG | HEIGHT: 58 IN

## 2024-03-08 DIAGNOSIS — I25.10 CORONARY ARTERY DISEASE INVOLVING NATIVE CORONARY ARTERY OF NATIVE HEART WITHOUT ANGINA PECTORIS: ICD-10-CM

## 2024-03-08 DIAGNOSIS — F41.9 ANXIETY: ICD-10-CM

## 2024-03-08 DIAGNOSIS — J40 BRONCHITIS: ICD-10-CM

## 2024-03-08 DIAGNOSIS — E78.2 MIXED HYPERLIPIDEMIA: ICD-10-CM

## 2024-03-08 DIAGNOSIS — I10 BENIGN ESSENTIAL HTN: Primary | ICD-10-CM

## 2024-03-08 PROCEDURE — 3078F DIAST BP <80 MM HG: CPT | Performed by: FAMILY MEDICINE

## 2024-03-08 PROCEDURE — 1159F MED LIST DOCD IN RCRD: CPT | Performed by: FAMILY MEDICINE

## 2024-03-08 PROCEDURE — 1160F RVW MEDS BY RX/DR IN RCRD: CPT | Performed by: FAMILY MEDICINE

## 2024-03-08 PROCEDURE — 1036F TOBACCO NON-USER: CPT | Performed by: FAMILY MEDICINE

## 2024-03-08 PROCEDURE — 1126F AMNT PAIN NOTED NONE PRSNT: CPT | Performed by: FAMILY MEDICINE

## 2024-03-08 PROCEDURE — 99214 OFFICE O/P EST MOD 30 MIN: CPT | Performed by: FAMILY MEDICINE

## 2024-03-08 PROCEDURE — 3074F SYST BP LT 130 MM HG: CPT | Performed by: FAMILY MEDICINE

## 2024-03-08 RX ORDER — AZITHROMYCIN 250 MG/1
TABLET, FILM COATED ORAL
Qty: 6 TABLET | Refills: 3 | Status: SHIPPED | OUTPATIENT
Start: 2024-03-08

## 2024-03-08 RX ORDER — PREDNISONE 10 MG/1
40 TABLET ORAL DAILY
Qty: 20 TABLET | Refills: 0 | Status: SHIPPED | OUTPATIENT
Start: 2024-03-08 | End: 2024-03-13

## 2024-03-08 NOTE — PROGRESS NOTES
"Subjective   Patient ID: Katrin Garcia is a 73 y.o. female who presents for Follow-up (6 mo rev labs).    HPI   copd- two exacerbations since last ov. Rare albuterol with illness, bronchitis an x2 and covid. Pred and zpak  Hyperlipidemia- is on a statin and a prudent diet.  GERD-Takes PPI daily with no breakthrough symptoms.  Reviewed dietary, caffeine, tobacco, alcohol, and NSAID avoidance. No dyspepsia, dysphagia, reflux, melena, or abdominal pain.  Depression- dr hernandez, on prozac 70, sees monthly. On vit d at 5k  HTN-Takes and tolerates meds without side effects. No alcohol. no tobacco. no exercise. low salt.  Reviewed recommendation for 150 minutes of exercise per week including 2 days of weight training if over age 50  CAD-no angina  Review of Systems    Objective   /78   Pulse 65   Ht 1.473 m (4' 10\")   Wt 99.5 kg (219 lb 6.4 oz)   SpO2 95%   BMI 45.85 kg/m²     Physical Exam    Assessment/Plan   Problem List Items Addressed This Visit             ICD-10-CM    Anxiety F41.9    Benign essential HTN - Primary I10    Relevant Orders    Follow Up In Primary Care    Hyperlipidemia E78.5    Relevant Orders    Follow Up In Primary Care     Other Visit Diagnoses         Codes    Coronary artery disease involving native coronary artery of native heart without angina pectoris     I25.10    Relevant Orders    Follow Up In Primary Care    Bronchitis     J40    Relevant Medications    azithromycin (Zithromax) 250 mg tablet    predniSONE (Deltasone) 10 mg tablet    Other Relevant Orders    Follow Up In Primary Care               "

## 2024-03-27 ENCOUNTER — OFFICE VISIT (OUTPATIENT)
Dept: URGENT CARE | Facility: CLINIC | Age: 74
End: 2024-03-27
Payer: MEDICARE

## 2024-03-27 VITALS
RESPIRATION RATE: 12 BRPM | TEMPERATURE: 97.5 F | OXYGEN SATURATION: 98 % | DIASTOLIC BLOOD PRESSURE: 84 MMHG | SYSTOLIC BLOOD PRESSURE: 130 MMHG | HEART RATE: 60 BPM

## 2024-03-27 DIAGNOSIS — S46.811A TRAPEZIUS MUSCLE STRAIN, RIGHT, INITIAL ENCOUNTER: Primary | ICD-10-CM

## 2024-03-27 PROCEDURE — 99213 OFFICE O/P EST LOW 20 MIN: CPT | Performed by: NURSE PRACTITIONER

## 2024-03-27 RX ORDER — PREDNISONE 10 MG/1
TABLET ORAL
Qty: 21 TABLET | Refills: 0 | Status: SHIPPED | OUTPATIENT
Start: 2024-03-27 | End: 2024-04-01

## 2024-03-27 RX ORDER — DICLOFENAC SODIUM 10 MG/G
2 GEL TOPICAL 4 TIMES DAILY PRN
Qty: 100 G | Refills: 0 | Status: SHIPPED | OUTPATIENT
Start: 2024-03-27

## 2024-03-27 RX ORDER — CYCLOBENZAPRINE HCL 10 MG
10 TABLET ORAL 3 TIMES DAILY PRN
Qty: 30 TABLET | Refills: 0 | Status: SHIPPED | OUTPATIENT
Start: 2024-03-27

## 2024-03-27 NOTE — PROGRESS NOTES
73 y.o. female presents for evaluation of right trap pain and spasm that began a week ago. Denies any known injury. Does have degenerative changes to Cspine per pt. Denies numbness/tingling, chest pains, SOB, n/v/d, abdominal pains, headache, or any other associated symptoms. No otc meds for symptoms. No other complaints.      Vitals:    03/27/24 1412   BP: 130/84   Pulse: 60   Resp: 12   Temp: 36.4 °C (97.5 °F)   SpO2: 98%       Allergies   Allergen Reactions    Bupropion Hcl Other     DEPRESSION    Codeine Anaphylaxis    Adhesive Tape-Silicones Swelling    Atorvastatin Other     MYALGIA    Bupropion Respiratory depression and Unknown    Camphor-Eucalyptus Oil-Menthol Unknown    Clindamycin Unknown    Erythromycin Base Unknown    Moxifloxacin Other     VOMITING    Nut - Unspecified Unknown    Pineapple Swelling    Procaine Unknown    Tetanus Toxoid Swelling    Tetanus Toxoid, Adsorbed Other    Latex Other and Rash       Medication Documentation Review Audit       Reviewed by Carlota Long MA (Medical Assistant) on 03/27/24 at 1411      Medication Order Taking? Sig Documenting Provider Last Dose Status   albuterol 90 mcg/actuation inhaler [id number] Yes Inhale 2 puffs every 6 hours if needed for wheezing. Dominic Ross, APRN-CNP Taking Active   atenolol (Tenormin) 50 mg tablet [id number] Yes take 1 tablet by mouth once daily Jorge Sesay MD Taking Active   azithromycin (Zithromax) 250 mg tablet [id number] No Take 2 tabs on day 1, then 1 tab daily on days 2 through 5.   Patient not taking: Reported on 3/27/2024    Jorge Sesay MD Not Taking Active   cholecalciferol, vitamin D3, (VITAMIN D3 ORAL) [id number] Yes Take by mouth. Historical Provider, MD Taking Active   cyanocobalamin (Vitamin B-12) 1,000 mcg tablet [id number] Yes Take 100 mcg by mouth once daily. Historical Provider, MD Taking Active   fexofenadine (Allegra Allergy) 180 mg tablet [id number] Yes Take 1 tablet (180 mg) by mouth once daily.  Historical Provider, MD Taking Active   FLUoxetine (PROzac) 10 mg capsule [id number] No take 1 capsule by mouth once daily take with 40 milligram and 20 ...  (REFER TO PRESCRIPTION NOTES). Historical Provider, MD Not Taking Active   FLUoxetine (PROzac) 20 mg capsule [id number] No Take 1 capsule (20 mg) by mouth once daily. Historical Provider, MD Not Taking Active   FLUoxetine (PROzac) 40 mg capsule [id number] Yes take 1 capsule by mouth once daily Jorge Sesay MD Taking Active   fluticasone (Flonase) 50 mcg/actuation nasal spray [id number] Yes Administer 1 spray into each nostril once daily. Jorge Sesay MD Taking Active   IODINE ORAL [id number] Yes Take by mouth. Historical Provider, MD Taking Active   mometasone (Elocon) 0.1 % cream [id number] Yes Apply topically once daily. APPLY SPARINGLY TO AFFECTED AREA(S) ONCE DAILY Historical Provider, MD Taking Active   omeprazole (PriLOSEC) 20 mg DR capsule [id number] Yes take 1 capsule by mouth once daily Jorge Sesay MD Taking Active   phytonadione, vit K1, (vitamin k) 100 mcg tablet [id number] Yes Take 1 tablet (100 mcg) by mouth once daily. Historical Provider, MD Taking Active   simvastatin (Zocor) 40 mg tablet [id number] Yes take 1 tablet by mouth at bedtime Jorge Sesay MD Taking Active                    Past Medical History:   Diagnosis Date    Encounter for gynecological examination (general) (routine) without abnormal findings 01/26/2023    ASC-US HPV -    H/O mammogram 02/01/2023    Other conditions influencing health status     Menarche    Personal history of other complications of pregnancy, childbirth and the puerperium     History of ectopic pregnancy    Personal history of other diseases of the nervous system and sense organs     History of sleep apnea       Past Surgical History:   Procedure Laterality Date    HYSTERECTOMY  1982    OTHER SURGICAL HISTORY  01/06/2020    Kidney surgery    OTHER SURGICAL HISTORY  01/06/2020    Hand surgery     OTHER SURGICAL HISTORY  01/06/2020    Colonoscopy    OTHER SURGICAL HISTORY  01/06/2020    Ankle surgery    OTHER SURGICAL HISTORY  01/06/2020    Cholecystectomy    OTHER SURGICAL HISTORY  01/06/2020    Appendectomy    OTHER SURGICAL HISTORY  01/06/2020    Tonsillectomy       ROS  See HPI    Physical Exam  Vitals and nursing note reviewed.   Constitutional:       Appearance: Normal appearance. She is normal weight.   Cardiovascular:      Rate and Rhythm: Normal rate and regular rhythm.   Musculoskeletal:         General: Tenderness present.      Right shoulder: No swelling, deformity, bony tenderness or crepitus. Normal range of motion. Normal strength. Normal pulse.        Arms:    Skin:     General: Skin is warm and dry.   Neurological:      General: No focal deficit present.      Mental Status: She is alert and oriented to person, place, and time.   Psychiatric:         Mood and Affect: Mood normal.         Behavior: Behavior normal.         Assessment/Plan/MDM  Katrin was seen today for back pain.  Diagnoses and all orders for this visit:  Trapezius muscle strain, right, initial encounter (Primary)  -     predniSONE (Deltasone) 10 mg tablet; Take 6 tablets (60 mg) by mouth once daily for 1 day, THEN 5 tablets (50 mg) once daily for 1 day, THEN 4 tablets (40 mg) once daily for 1 day, THEN 3 tablets (30 mg) once daily for 1 day, THEN 2 tablets (20 mg) once daily for 1 day, THEN 1 tablet (10 mg) once daily for 1 day.  -     cyclobenzaprine (Flexeril) 10 mg tablet; Take 1 tablet (10 mg) by mouth 3 times a day as needed for muscle spasms.    Encouraged rest, ice/heat. Pt declines imaging today. Patient's clinical presentation is otherwise unremarkable at this time. Patient is discharged with instructions to follow-up with primary care or seek emergency medical attention for worsening symptoms or any new concerns.    I did personally review Katrin's past medical history, surgical history, social history, as well as  family history (when relevant).  In this case, I also oversaw the her drug management by reviewing her medication list, allergy list, as well as the medications that I prescribed during the UC course and/or recommended as an out-patient (including possible OTC medications such as acetaminophen, NSAIDs , etc).    After reviewing the items above, I did not look at previous medical documentation, such as recent hospitalizations, office visits, and/or recent consultations with PCP/specialist.                          SDOH:   Another factor that I considered in Katrin's care was her Social Determinants of Health (SDOH). During this UC encounter, she did not have social determinants of health. Those SDOH influencing Katrin's care are: none      Dominic Ross CNP  [facility]  [phone]

## 2024-04-10 ENCOUNTER — APPOINTMENT (OUTPATIENT)
Dept: PRIMARY CARE | Facility: CLINIC | Age: 74
End: 2024-04-10
Payer: MEDICARE

## 2024-07-19 DIAGNOSIS — E78.2 MIXED HYPERLIPIDEMIA: ICD-10-CM

## 2024-07-19 DIAGNOSIS — I10 BENIGN ESSENTIAL HTN: ICD-10-CM

## 2024-07-19 DIAGNOSIS — K21.9 GASTROESOPHAGEAL REFLUX DISEASE WITHOUT ESOPHAGITIS: ICD-10-CM

## 2024-07-19 RX ORDER — ATENOLOL 50 MG/1
50 TABLET ORAL DAILY
Qty: 90 TABLET | Refills: 3 | Status: SHIPPED | OUTPATIENT
Start: 2024-07-19 | End: 2025-07-19

## 2024-07-19 RX ORDER — OMEPRAZOLE 20 MG/1
20 CAPSULE, DELAYED RELEASE ORAL DAILY
Qty: 90 CAPSULE | Refills: 3 | Status: SHIPPED | OUTPATIENT
Start: 2024-07-19 | End: 2025-07-19

## 2024-07-19 RX ORDER — SIMVASTATIN 40 MG/1
40 TABLET, FILM COATED ORAL NIGHTLY
Qty: 90 TABLET | Refills: 3 | Status: SHIPPED | OUTPATIENT
Start: 2024-07-19 | End: 2025-07-19

## 2024-09-05 ENCOUNTER — OFFICE VISIT (OUTPATIENT)
Dept: SLEEP MEDICINE | Facility: CLINIC | Age: 74
End: 2024-09-05
Payer: MEDICARE

## 2024-09-05 DIAGNOSIS — E66.01 MORBID (SEVERE) OBESITY DUE TO EXCESS CALORIES (MULTI): ICD-10-CM

## 2024-09-05 DIAGNOSIS — G25.81 RESTLESS LEG SYNDROME: ICD-10-CM

## 2024-09-05 DIAGNOSIS — G47.33 OBSTRUCTIVE SLEEP APNEA, ADULT: Primary | ICD-10-CM

## 2024-09-05 PROCEDURE — 1036F TOBACCO NON-USER: CPT | Performed by: NURSE PRACTITIONER

## 2024-09-05 PROCEDURE — G2211 COMPLEX E/M VISIT ADD ON: HCPCS | Performed by: NURSE PRACTITIONER

## 2024-09-05 PROCEDURE — 1159F MED LIST DOCD IN RCRD: CPT | Performed by: NURSE PRACTITIONER

## 2024-09-05 PROCEDURE — 99215 OFFICE O/P EST HI 40 MIN: CPT | Performed by: NURSE PRACTITIONER

## 2024-09-05 PROCEDURE — 1160F RVW MEDS BY RX/DR IN RCRD: CPT | Performed by: NURSE PRACTITIONER

## 2024-09-05 RX ORDER — BUPROPION HYDROCHLORIDE 100 MG/1
1 TABLET, EXTENDED RELEASE ORAL
COMMUNITY
Start: 2024-08-07

## 2024-09-05 RX ORDER — LEVOTHYROXINE SODIUM 75 UG/1
TABLET ORAL
COMMUNITY
Start: 2024-08-21

## 2024-09-05 ASSESSMENT — ENCOUNTER SYMPTOMS
OCCASIONAL FEELINGS OF UNSTEADINESS: 0
LOSS OF SENSATION IN FEET: 0
DEPRESSION: 0

## 2024-09-05 NOTE — PROGRESS NOTES
Patient: Katrin Garcia    [id number]  : 1950 -- AGE 74 y.o.    Provider: QUYNH Tolentino     Location [facility]   Service Date: 2024              [facility]  Followup Visit Note    Virtual or Telephone Consent  An interactive audio and video telecommunication system which permits real time communications between the patient (at the originating site) and provider (at the distant site) was utilized to provide this telehealth service.   Verbal consent was requested and obtained from Katrin Garcia on this date, 24 for a telehealth visit.     HISTORY OF PRESENT ILLNESS       HISTORY OF PRESENT ILLNESS   Katrin Garcia is a 74 y.o. female with past medical history of anxiety, HTN, insomnia, DDD, hyperlipidemia, obesity who presents to a [facility] for followup. KATRIN is a retired . Daughter is an RN at [employer].     PAST SLEEP HISTORY  Received results of sleep study dated 21 at  Sleep lab showing severe sleep apnea with an AHI of 29.9 and SpO2 shira of 76%. Recommendation is for APAP at 6-16 cwp.     CURRENT SLEEP HISTORY    On today's visit, the patient reports doing well on PAP. Feels she sleeps more soundly than she used to . Feels refreshed.  Notes she skipped it a few nights recently when she went camping at felt ok without it. Asking how we would know if things have resolved/ improved.  Working on medication adjustments for mood- started fluoxetine and wellbutrin per PCP  Notes she was diagnosed with ADHD recently, feels she has likely struggled with it most of her life. Has adapted in other ways.     RLS Followup:  once in a while. Walks around and that resolves it   Has a pinched nerve that makes her thigh go numb    Insomnia follow up:  Bedtime:  Sleep Latency:  Awakenings:  Wake time:  Naps:       ESS: No data recorded   MANISHA:    FOSQ:    REVIEW OF SYSTEMS      REVIEW OF SYSTEMS  Review of Systems   All other systems reviewed and are negative.    ALLERGIES AND MEDICATIONS     ALLERGIES  Allergies   Allergen Reactions   • Bupropion Hcl Other     DEPRESSION   • Codeine Anaphylaxis   • Adhesive Tape-Silicones Swelling   • Atorvastatin Other     MYALGIA   • Bupropion Respiratory depression and Unknown   • Camphor-Eucalyptus Oil-Menthol Unknown   • Clindamycin Unknown   • Erythromycin Base Unknown   • Moxifloxacin Other     VOMITING   • Nut - Unspecified Unknown   • Pineapple Swelling   • Procaine Unknown   • Tetanus Toxoid Swelling   • Tetanus Toxoid, Adsorbed Other   • Latex Other and Rash       MEDICATIONS  Current Outpatient Medications   Medication Sig Dispense Refill   • albuterol 90 mcg/actuation inhaler Inhale 2 puffs every 6 hours if needed for wheezing. 18 g 0   • atenolol (Tenormin) 50 mg tablet Take 1 tablet (50 mg) by mouth once daily. 90 tablet 3   • buPROPion SR (Wellbutrin SR) 100 mg 12 hr tablet Take 1 tablet (100 mg) by mouth every 12 hours.     • cholecalciferol, vitamin D3, (VITAMIN D3 ORAL) Take by mouth.     • cyanocobalamin (Vitamin B-12) 1,000 mcg tablet Take 100 mcg by mouth once daily.     • cyclobenzaprine (Flexeril) 10 mg tablet Take 1 tablet (10 mg) by mouth 3 times a day as needed for muscle spasms. 30 tablet 0   • diclofenac sodium (Voltaren) 1 % gel Apply 2.25 inches (2 g) topically 4 times a day as needed (pain). 100 g 0   • fexofenadine (Allegra Allergy) 180 mg tablet Take 1 tablet (180 mg) by mouth once daily.     • FLUoxetine (PROzac) 40 mg capsule take 1 capsule by mouth once daily (Patient taking differently: Take 1 capsule (40 mg) by mouth 2 times a day.) 90 capsule 2   • fluticasone (Flonase) 50 mcg/actuation nasal spray Administer 1 spray into each nostril once daily. 16 g 3   • IODINE ORAL Take by mouth.     • levothyroxine (Synthroid, Levoxyl) 75 mcg tablet TAKE 1 TABLET BY MOUTH ONCE DAILY EXCEPT 1 AND 1/2 TABLETS EVERY SUNDAY AND  WEDNESDAY     • mometasone (Elocon) 0.1 % cream Apply topically once daily. APPLY SPARINGLY TO AFFECTED AREA(S) ONCE DAILY     • omeprazole (PriLOSEC) 20 mg DR capsule Take 1 capsule (20 mg) by mouth once daily. 90 capsule 3   • phytonadione, vit K1, (vitamin k) 100 mcg tablet Take 1 tablet (100 mcg) by mouth once daily.     • simvastatin (Zocor) 40 mg tablet Take 1 tablet (40 mg) by mouth once daily at bedtime. 90 tablet 3     No current facility-administered medications for this visit.       PPAST MEDICAL HISTORY  Past Medical History:   Diagnosis Date   • Encounter for gynecological examination (general) (routine) without abnormal findings 01/26/2023    ASC-US HPV -   • H/O mammogram 02/01/2023   • Other conditions influencing health status     Menarche   • Personal history of other complications of pregnancy, childbirth and the puerperium     History of ectopic pregnancy   • Personal history of other diseases of the nervous system and sense organs     History of sleep apnea       PAST SURGICAL HISTORY:  Past Surgical History:   Procedure Laterality Date   • HYSTERECTOMY  1982   • OTHER SURGICAL HISTORY  01/06/2020    Kidney surgery   • OTHER SURGICAL HISTORY  01/06/2020    Hand surgery   • OTHER SURGICAL HISTORY  01/06/2020    Colonoscopy   • OTHER SURGICAL HISTORY  01/06/2020    Ankle surgery   • OTHER SURGICAL HISTORY  01/06/2020    Cholecystectomy   • OTHER SURGICAL HISTORY  01/06/2020    Appendectomy   • OTHER SURGICAL HISTORY  01/06/2020    Tonsillectomy       FAMILY HISTORY  Family History   Problem Relation Name Age of Onset   • Asthma Mother     • Depression Mother     • Cancer Mother     • Hypertension Mother     • Depression Father     • Hypertension Father     • Prostate cancer Father     • Other (CARDIAC DISORDER) Father     • Hypertension Sister     • Hypertension Brother     • Heart attack Other          GRANDPARENT       FAMILY HISTORY: No changes since previous visit. Otherwise non-contributory  as charted.       SOCIAL HISTORY  She  reports that she has never smoked. She has never used smokeless tobacco. She reports that she does not currently use alcohol. Drug use questions deferred to the physician.       PHYSICAL EXAM     VITAL SIGNS: There were no vitals taken for this visit.     PREVIOUS WEIGHTS:  Wt Readings from Last 3 Encounters:   03/08/24 99.5 kg (219 lb 6.4 oz)   02/19/24 96.2 kg (212 lb)   02/08/24 99.2 kg (218 lb 12.8 oz)       Physical Exam  Physical Exam   Constitutional: Alert and oriented, cooperative, no obvious distress   HEENT: Non icteric or anemic, EOM WNL bilaterally   Neck: Supple, no JVD, no goiter, no adenopathy, no rigidity  Chest: CTA bilaterally, no wheezing, crackles, rubs   Cardiac: RRR, S1 and S2, no murmur, rub, thrill   Abdomen: Obese, Soft, nontender, no masses, no organomegaly   Extremities: No clubbing, no LL edema   Neuromuscular: Cranial nerves grossly intact, no focal deficits      RESULTS/DATA     Bicarbonate   Date Value   03/01/2024 24 mmol/L   08/31/2023 24 mmol/L   03/02/2023 26 mmol/L   03/02/2023 CANCELED     Ferritin (ug/L)   Date Value   10/06/2020 85       No results found for this or any previous visit from the past 365 days.         PAP Adherence:      Airsense 10- 2/22/2021    N30i- 6/14/2024    ASSESSMENT/PLAN     Ms. Garcia is a 74 y.o. female and She returns in followup to the [facility] for SUKH.    Problem List and Orders  Problem List Items Addressed This Visit             ICD-10-CM    Morbid (severe) obesity due to excess calories (Multi) E66.01     Weight loss recommended  Plans to follow up with PCP          Obstructive sleep apnea, adult - Primary G47.33     sleep study dated 1/29/21 at  Sleep lab showing severe sleep apnea with an AHI of 29.9 and SpO2 shira of 76%. Recommendation is for APAP at 6-16 cwp.  -Well controlled on current settings  -continue current PAP settings  -Supply order updated  today  -Discussed possibility for repeat testing. Consider weight loss goals and when to repeat. Discussed today.   -RTC 12 mo or sooner if needed            Relevant Orders    Positive Airway Pressure (PAP) Therapy    Restless leg syndrome G25.81     Infrequent  Recently started SSRI with dosage adjustments and addition of wellbutrin  Takes antihistamine as well  Will continue to monitor            Disposition    Return to clinic in 12 months

## 2024-09-05 NOTE — ASSESSMENT & PLAN NOTE
sleep study dated 1/29/21 at  Sleep lab showing severe sleep apnea with an AHI of 29.9 and SpO2 shira of 76%. Recommendation is for APAP at 6-16 cwp.  -Well controlled on current settings  -continue current PAP settings  -Supply order updated today  -Discussed possibility for repeat testing. Consider weight loss goals and when to repeat. Discussed today.   -RTC 12 mo or sooner if needed

## 2024-09-05 NOTE — ASSESSMENT & PLAN NOTE
Infrequent  Recently started SSRI with dosage adjustments and addition of wellbutrin  Takes antihistamine as well  Will continue to monitor

## 2024-09-06 ENCOUNTER — LAB (OUTPATIENT)
Dept: LAB | Facility: LAB | Age: 74
End: 2024-09-06
Payer: MEDICARE

## 2024-09-06 DIAGNOSIS — Z79.899 OTHER LONG TERM (CURRENT) DRUG THERAPY: ICD-10-CM

## 2024-09-06 DIAGNOSIS — E55.9 VITAMIN D DEFICIENCY, UNSPECIFIED: ICD-10-CM

## 2024-09-06 DIAGNOSIS — E03.9 HYPOTHYROIDISM, UNSPECIFIED: Primary | ICD-10-CM

## 2024-09-06 LAB
25(OH)D3 SERPL-MCNC: 47 NG/ML (ref 30–100)
ANION GAP SERPL CALC-SCNC: 15 MMOL/L (ref 10–20)
BUN SERPL-MCNC: 16 MG/DL (ref 6–23)
CALCIUM SERPL-MCNC: 9 MG/DL (ref 8.6–10.3)
CHLORIDE SERPL-SCNC: 101 MMOL/L (ref 98–107)
CO2 SERPL-SCNC: 23 MMOL/L (ref 21–32)
CREAT SERPL-MCNC: 0.83 MG/DL (ref 0.5–1.05)
EGFRCR SERPLBLD CKD-EPI 2021: 74 ML/MIN/1.73M*2
EST. AVERAGE GLUCOSE BLD GHB EST-MCNC: 117 MG/DL
GLUCOSE SERPL-MCNC: 106 MG/DL (ref 74–99)
HBA1C MFR BLD: 5.7 %
POTASSIUM SERPL-SCNC: 4.3 MMOL/L (ref 3.5–5.3)
SODIUM SERPL-SCNC: 135 MMOL/L (ref 136–145)
TSH SERPL-ACNC: 3.23 MIU/L (ref 0.44–3.98)

## 2024-09-06 PROCEDURE — 80048 BASIC METABOLIC PNL TOTAL CA: CPT

## 2024-09-06 PROCEDURE — 84443 ASSAY THYROID STIM HORMONE: CPT

## 2024-09-06 PROCEDURE — 82306 VITAMIN D 25 HYDROXY: CPT

## 2024-09-06 PROCEDURE — 36415 COLL VENOUS BLD VENIPUNCTURE: CPT

## 2024-09-06 PROCEDURE — 83036 HEMOGLOBIN GLYCOSYLATED A1C: CPT

## 2024-09-10 ENCOUNTER — APPOINTMENT (OUTPATIENT)
Dept: PRIMARY CARE | Facility: CLINIC | Age: 74
End: 2024-09-10
Payer: MEDICARE

## 2024-09-10 VITALS
BODY MASS INDEX: 45.38 KG/M2 | SYSTOLIC BLOOD PRESSURE: 122 MMHG | HEIGHT: 58 IN | WEIGHT: 216.2 LBS | DIASTOLIC BLOOD PRESSURE: 84 MMHG | OXYGEN SATURATION: 97 % | HEART RATE: 65 BPM

## 2024-09-10 DIAGNOSIS — J40 BRONCHITIS: ICD-10-CM

## 2024-09-10 DIAGNOSIS — I10 BENIGN ESSENTIAL HTN: ICD-10-CM

## 2024-09-10 DIAGNOSIS — I25.10 CORONARY ARTERY DISEASE INVOLVING NATIVE CORONARY ARTERY OF NATIVE HEART WITHOUT ANGINA PECTORIS: ICD-10-CM

## 2024-09-10 DIAGNOSIS — E78.2 MIXED HYPERLIPIDEMIA: ICD-10-CM

## 2024-09-10 DIAGNOSIS — Z00.00 ROUTINE GENERAL MEDICAL EXAMINATION AT HEALTH CARE FACILITY: Primary | ICD-10-CM

## 2024-09-10 PROCEDURE — G0439 PPPS, SUBSEQ VISIT: HCPCS | Performed by: FAMILY MEDICINE

## 2024-09-10 PROCEDURE — 3008F BODY MASS INDEX DOCD: CPT | Performed by: FAMILY MEDICINE

## 2024-09-10 PROCEDURE — 1160F RVW MEDS BY RX/DR IN RCRD: CPT | Performed by: FAMILY MEDICINE

## 2024-09-10 PROCEDURE — 1170F FXNL STATUS ASSESSED: CPT | Performed by: FAMILY MEDICINE

## 2024-09-10 PROCEDURE — 1159F MED LIST DOCD IN RCRD: CPT | Performed by: FAMILY MEDICINE

## 2024-09-10 PROCEDURE — 1124F ACP DISCUSS-NO DSCNMKR DOCD: CPT | Performed by: FAMILY MEDICINE

## 2024-09-10 PROCEDURE — 3074F SYST BP LT 130 MM HG: CPT | Performed by: FAMILY MEDICINE

## 2024-09-10 PROCEDURE — 99214 OFFICE O/P EST MOD 30 MIN: CPT | Performed by: FAMILY MEDICINE

## 2024-09-10 PROCEDURE — 1036F TOBACCO NON-USER: CPT | Performed by: FAMILY MEDICINE

## 2024-09-10 PROCEDURE — 3079F DIAST BP 80-89 MM HG: CPT | Performed by: FAMILY MEDICINE

## 2024-09-10 ASSESSMENT — PATIENT HEALTH QUESTIONNAIRE - PHQ9
1. LITTLE INTEREST OR PLEASURE IN DOING THINGS: NOT AT ALL
SUM OF ALL RESPONSES TO PHQ9 QUESTIONS 1 AND 2: 0
2. FEELING DOWN, DEPRESSED OR HOPELESS: NOT AT ALL

## 2024-09-10 ASSESSMENT — ACTIVITIES OF DAILY LIVING (ADL)
TAKING_MEDICATION: INDEPENDENT
BATHING: INDEPENDENT
GROCERY_SHOPPING: INDEPENDENT
MANAGING_FINANCES: INDEPENDENT
DOING_HOUSEWORK: INDEPENDENT
DRESSING: INDEPENDENT

## 2024-09-10 ASSESSMENT — ENCOUNTER SYMPTOMS
OCCASIONAL FEELINGS OF UNSTEADINESS: 0
LOSS OF SENSATION IN FEET: 0
DEPRESSION: 0

## 2024-09-10 NOTE — PROGRESS NOTES
"Subjective   Reason for Visit: Katrin Garcia is an 74 y.o. female here for a Medicare Wellness visit.     Past Medical, Surgical, and Family History reviewed and updated in chart.    Reviewed all medications by prescribing practitioner or clinical pharmacist (such as prescriptions, OTCs, herbal therapies and supplements) and documented in the medical record.    HPI  Since the last office visit there have been no interval operations, hospitalizations, important illnesses or injuries.  OA- considering TKR left  Depression, psych added wellbutrin back and encouraged maintennace.  Mood nis much better better  Hypothyroid- is euthyroid on replacement. Thyroid ros is unremarkable.  Hyperlipidemia- is on a statin and a prudent diet.  HTN-Takes and tolerates meds without side effects. No alcohol. no tobacco. no exercise. low salt.  Reviewed recommendation for 150 minutes of exercise per week including 2 days of weight training if over age 50  Cad- stablke  No zpak /pred use  Patient Care Team:  Jorge Sesay MD as PCP - General (Family Medicine)  Jorge Sesay MD as PCP - [employer] PCP   Dr lind  Review of Systems  General-no fatigue weight to within 10 pounds  ENT no problems with vision swallowing  Cardiac no chest pains palpitations change in exercise tolerance or capacity  Pulmonary no cough shortness of breath  GI no heartburn or abdominal pain  Musculoskeletal no joint pains  Objective   Vitals:  /84   Pulse 65   Ht 1.473 m (4' 10\")   Wt 98.1 kg (216 lb 3.2 oz)   SpO2 97%   BMI 45.19 kg/m²       Physical Exam  General:  Alert, No acute distress. Appears stated age  Eye:  Pupils are equal, round and reactive to light, Extraocular movements are intact, Normal conjunctiva.    Neck:  Supple, Non-tender, No carotid bruit, No jugular venous distention, No lymphadenopathy, No thyromegaly.    Respiratory:  Lungs are clear to auscultation, Respirations are non-labored, Breath sounds are " equal.    Cardiovascular:  Normal rate, Regular rhythm, No murmur.    Gastrointestinal:  Soft, Non-tender, No organomegaly. No solid or pulsatile mass  Integumentary:  Warm, Dry. No concerning lesions on exposed areas  Neurologic:  Alert, Oriented.  Gross and fine motor intact, CN 2-12 intact  Psychiatric:  Cooperative, Appropriate mood & affect.  Assessment & Plan  Benign essential HTN    Orders:    Follow Up In Primary Care    Follow Up In Primary Care; Future    Coronary artery disease involving native coronary artery of native heart without angina pectoris    Orders:    Follow Up In Primary Care    Follow Up In Primary Care; Future    Mixed hyperlipidemia    Orders:    Follow Up In Primary Care    Follow Up In Primary Care; Future    Bronchitis    Orders:    Follow Up In Primary Care    Follow Up In Primary Care; Future    Routine general medical examination at health care facility    Orders:    Follow Up In Primary Care; Future

## 2024-12-03 ENCOUNTER — APPOINTMENT (OUTPATIENT)
Dept: CARDIOLOGY | Facility: CLINIC | Age: 74
End: 2024-12-03
Payer: MEDICARE

## 2024-12-03 VITALS
HEART RATE: 68 BPM | BODY MASS INDEX: 46.18 KG/M2 | DIASTOLIC BLOOD PRESSURE: 96 MMHG | SYSTOLIC BLOOD PRESSURE: 140 MMHG | WEIGHT: 220 LBS | HEIGHT: 58 IN | OXYGEN SATURATION: 98 %

## 2024-12-03 DIAGNOSIS — Z01.810 PREOPERATIVE CARDIOVASCULAR EXAMINATION: Primary | ICD-10-CM

## 2024-12-03 DIAGNOSIS — E78.2 MIXED HYPERLIPIDEMIA: ICD-10-CM

## 2024-12-03 DIAGNOSIS — I10 BENIGN ESSENTIAL HTN: ICD-10-CM

## 2024-12-03 PROCEDURE — 1159F MED LIST DOCD IN RCRD: CPT

## 2024-12-03 PROCEDURE — 1036F TOBACCO NON-USER: CPT

## 2024-12-03 PROCEDURE — 3080F DIAST BP >= 90 MM HG: CPT

## 2024-12-03 PROCEDURE — 3077F SYST BP >= 140 MM HG: CPT

## 2024-12-03 PROCEDURE — 3008F BODY MASS INDEX DOCD: CPT

## 2024-12-03 PROCEDURE — 93000 ELECTROCARDIOGRAM COMPLETE: CPT

## 2024-12-03 PROCEDURE — 99204 OFFICE O/P NEW MOD 45 MIN: CPT

## 2024-12-03 NOTE — PROGRESS NOTES
[facility]  Cardiology Clinic Visit Note    History of present illness:  This is a 74 y.o. female never smoker with a history of hypertension, hyperlipidemia, hypothyroidism, anxiety, obstructive sleep apnea and iron deficiency anemia who presents to the clinic for preoperative cardiovascular examination and risk stratification prior to a robotic left total knee replacement.    Reports heart attack in 1992. Denies PCI or CABG. Father had CAD and had CABG.   Negative SPECT in 2015  Her daughter is a nurse in our [facility] cardiac Cath Lab.    Subjective:  She denies any current chest pain or pressure.  She has felt chest pressure and tightness on occasion when laying flat in bed but has not felt in 6 months.  She gets short of breath go up a flight stairs.  Denies dizziness lightheadedness syncope near syncope fever or chills or orthopnea.    Active Issues and Current Diagnoses  Hypertension  -Current regimen with atenolol 50 mg daily  -Managed by PCP    Hyperlipidemia  -Current therapy with simvastatin 40 mg daily  -Last lipid panel dated August 2023 shows LDL of 89  -daily ASA 81 mg.     Assessment and Plan  -Preoperative Cardiovascular Examination and Risk Stratification  [place] Risk for Myocardia Infarction or Cardiac Arrest (CASPER)  0.2% risk of myocardial infraction or cardiac arrest, intraoperatively or up to 30 days post-op.  Low risk: major adverse cardiac event risk <1%    -She denies any current anginal symptoms.  I cannot find any records or evidence of MI from back in 1902 however she states that she felt very tight chest pressure was brought to the hospital states that they did a heart catheterization but she did not receive any stents or needed bypass surgery.  She previously followed with Dr. Marsh but has been lost to follow-up for almost 10 years.  Self-reports a history of mitral stenosis and mitral valve prolapse however I do not appreciate a cardiac murmur and  there are no viewable echocardiogram records.  Her limitations appear to be physical related to orthopedic problems, knee pain and obesity.  She is on aspirin and a medium intensity statin with an LDL at goal.  Blood pressure is elevated in the office today however previous readings show apparent well-controlled hypertension.  She is a low cardiac risk for low risk orthopedic surgery with no immediate cardiac concerns to preclude her from proceeding with her surgery.  Will continue to follow-up annually.  She will call me with any chest pain or pressure or increase shortness of breath concerns.    Return to Care:  1 year    Objective  Past Medical History  Past Medical History:   Diagnosis Date    Encounter for gynecological examination (general) (routine) without abnormal findings 01/26/2023    ASC-US HPV -    H/O mammogram 02/01/2023    Other conditions influencing health status     Menarche    Personal history of other complications of pregnancy, childbirth and the puerperium     History of ectopic pregnancy    Personal history of other diseases of the nervous system and sense organs     History of sleep apnea       Past Surgical History  Past Surgical History:   Procedure Laterality Date    HYSTERECTOMY  1982    OTHER SURGICAL HISTORY  01/06/2020    Kidney surgery    OTHER SURGICAL HISTORY  01/06/2020    Hand surgery    OTHER SURGICAL HISTORY  01/06/2020    Colonoscopy    OTHER SURGICAL HISTORY  01/06/2020    Ankle surgery    OTHER SURGICAL HISTORY  01/06/2020    Cholecystectomy    OTHER SURGICAL HISTORY  01/06/2020    Appendectomy    OTHER SURGICAL HISTORY  01/06/2020    Tonsillectomy       Medications  Current Outpatient Medications   Medication Instructions    albuterol 90 mcg/actuation inhaler 2 puffs, inhalation, Every 6 hours PRN    atenolol (TENORMIN) 50 mg, oral, Daily    buPROPion SR (Wellbutrin SR) 100 mg 12 hr tablet 1 tablet, oral, Every 12 hours scheduled (0630,1830)    cholecalciferol, vitamin D3,  (VITAMIN D3 ORAL) oral    cyanocobalamin (VITAMIN B-12) 100 mcg, oral, Daily    cyclobenzaprine (FLEXERIL) 10 mg, oral, 3 times daily PRN    diclofenac sodium (VOLTAREN) 2 g, Topical, 4 times daily PRN    fexofenadine (Allegra Allergy) 180 mg tablet 1 tablet, oral, Daily    FLUoxetine (PROZAC) 40 mg, oral, Daily    fluticasone (Flonase) 50 mcg/actuation nasal spray 1 spray, Each Nostril, Daily    IODINE ORAL oral    levothyroxine (Synthroid, Levoxyl) 75 mcg tablet TAKE 1 TABLET BY MOUTH ONCE DAILY EXCEPT 1 AND 1/2 TABLETS EVERY SUNDAY AND WEDNESDAY    mometasone (Elocon) 0.1 % cream Topical, Daily RT, APPLY SPARINGLY TO AFFECTED AREA(S) ONCE DAILY    omeprazole (PRILOSEC) 20 mg, oral, Daily    phytonadione, vit K1, (vitamin k) 100 mcg tablet 1 tablet, oral, Daily    simvastatin (ZOCOR) 40 mg, oral, Nightly       Allergies  Allergies   Allergen Reactions    Bupropion Hcl Other     DEPRESSION    Codeine Anaphylaxis    Adhesive Tape-Silicones Swelling    Atorvastatin Other     MYALGIA    Bupropion Respiratory depression and Unknown    Camphor-Eucalyptus Oil-Menthol Unknown    Clindamycin Unknown    Erythromycin Base Unknown    Moxifloxacin Other     VOMITING    Nut - Unspecified Unknown    Pineapple Swelling    Procaine Unknown    Tetanus Toxoid Swelling    Tetanus Toxoid, Adsorbed Other    Latex Other and Rash       Social History  Social History     Tobacco Use    Smoking status: Never    Smokeless tobacco: Never   Vaping Use    Vaping status: Never Used   Substance Use Topics    Alcohol use: Not Currently    Drug use: Defer       Family History  Family History   Problem Relation Name Age of Onset    Asthma Mother      Depression Mother      Cancer Mother      Hypertension Mother      Depression Father      Hypertension Father      Prostate cancer Father      Other (CARDIAC DISORDER) Father      Hypertension Sister      Hypertension Brother      Heart attack Other          GRANDPARENT       VITALS  Vitals:     12/03/24 1317   BP: (!) 140/96   Pulse: 68   SpO2: 98%       Weight  Vitals:    12/03/24 1317   Weight: 99.8 kg (220 lb)       PHYSICAL EXAM  Physical Exam  Vitals and nursing note reviewed.   Constitutional:       General: She is not in acute distress.     Appearance: She is obese.   HENT:      Head: Normocephalic and atraumatic.      Mouth/Throat:      Mouth: Mucous membranes are moist.      Pharynx: Oropharynx is clear.   Eyes:      General: No scleral icterus.     Pupils: Pupils are equal, round, and reactive to light.   Cardiovascular:      Rate and Rhythm: Normal rate and regular rhythm.      Pulses: Normal pulses.      Heart sounds: Normal heart sounds, S1 normal and S2 normal. No murmur heard.     No friction rub.   Pulmonary:      Effort: Pulmonary effort is normal.      Breath sounds: Normal breath sounds.   Abdominal:      General: Bowel sounds are normal. There is no distension.      Palpations: Abdomen is soft.      Tenderness: There is no abdominal tenderness.   Musculoskeletal:         General: Normal range of motion.      Cervical back: Normal range of motion and neck supple.      Right lower leg: No edema.      Left lower leg: No edema.   Skin:     General: Skin is warm and dry.      Capillary Refill: Capillary refill takes less than 2 seconds.      Findings: No rash.   Neurological:      General: No focal deficit present.      Mental Status: She is alert.   Psychiatric:         Mood and Affect: Mood normal.         Behavior: Behavior normal.         Cardiovascular Labs  Lab Results   Component Value Date    HGB 12.8 03/01/2024    HGB 12.2 08/31/2023    HGB 12.3 08/26/2022    HGB 12.1 08/24/2021     03/01/2024    WBC 8.9 03/01/2024     (L) 09/06/2024    K 4.3 09/06/2024    CREATININE 0.83 09/06/2024    CREATININE 0.81 03/01/2024    CREATININE 0.89 08/31/2023    CREATININE 1.01 03/02/2023    CREATININE CANCELED 03/02/2023    BUN 16 09/06/2024    CALCIUM 9.0 09/06/2024    LDLF 89  08/31/2023       Echocardiogram       The ASCVD Risk score (Tawnya DK, et al., 2019) failed to calculate for the following reasons:    Risk score cannot be calculated because patient has a medical history suggesting prior/existing ASCVD  Low Risk: <5%  Borderline Risk: 5%-7.4%  Intermediate Risk: 7.5% - 19.9%  High Risk: >20%    If your symptoms worsen or progress please go directory to your nearest emergency department for evaluation.     Thank you for this interesting clinical case and allowing me to participate in the care of this patient. Please reach me out if you have any questions or if you need any clarifications regarding this patient's care.    **Disclaimer: This note was dictated by speech recognition, and every effort has been made to prevent any error in transcription, however minor errors may be present**  ___________________________________________________  Jorge Russo, MSN, CNP, ACNPC, CCRN  Advanced Practice Provider, Nurse Practitioner  Division of Cardiovascular Medicine  [facility] [place]  [facility]

## 2025-02-13 ENCOUNTER — APPOINTMENT (OUTPATIENT)
Dept: OBSTETRICS AND GYNECOLOGY | Facility: CLINIC | Age: 75
End: 2025-02-13
Payer: MEDICARE

## 2025-02-13 VITALS
WEIGHT: 213 LBS | DIASTOLIC BLOOD PRESSURE: 86 MMHG | BODY MASS INDEX: 44.71 KG/M2 | SYSTOLIC BLOOD PRESSURE: 150 MMHG | HEIGHT: 58 IN

## 2025-02-13 DIAGNOSIS — Z12.31 ENCOUNTER FOR SCREENING MAMMOGRAM FOR MALIGNANT NEOPLASM OF BREAST: ICD-10-CM

## 2025-02-13 DIAGNOSIS — Z00.00 ROUTINE ADULT HEALTH MAINTENANCE: Primary | ICD-10-CM

## 2025-02-13 ASSESSMENT — COLUMBIA-SUICIDE SEVERITY RATING SCALE - C-SSRS
1. IN THE PAST MONTH, HAVE YOU WISHED YOU WERE DEAD OR WISHED YOU COULD GO TO SLEEP AND NOT WAKE UP?: NO
2. HAVE YOU ACTUALLY HAD ANY THOUGHTS OF KILLING YOURSELF?: NO
6. HAVE YOU EVER DONE ANYTHING, STARTED TO DO ANYTHING, OR PREPARED TO DO ANYTHING TO END YOUR LIFE?: NO

## 2025-02-13 ASSESSMENT — ENCOUNTER SYMPTOMS
PSYCHIATRIC NEGATIVE: 1
CONSTITUTIONAL NEGATIVE: 1

## 2025-02-13 ASSESSMENT — PATIENT HEALTH QUESTIONNAIRE - PHQ9
2. FEELING DOWN, DEPRESSED OR HOPELESS: NOT AT ALL
SUM OF ALL RESPONSES TO PHQ9 QUESTIONS 1 AND 2: 0
1. LITTLE INTEREST OR PLEASURE IN DOING THINGS: NOT AT ALL

## 2025-02-13 ASSESSMENT — PAIN SCALES - GENERAL: PAINLEVEL_OUTOF10: 0-NO PAIN

## 2025-02-13 NOTE — PROGRESS NOTES
Subjective   Patient ID: Katrin Garcia is a 74 y.o. female who presents for Gynecologic Exam (Patient is here for yearly exam and pap test. Patient does do regular self breast exams and has no concerns at this time. LMP: Hyster/).  Gynecologic Exam      Pt. Presents for annual. 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 Hx of normal paps prior to hyst. Pt. Denies any complaints or concerns. Due for mammogram. Pt. Aware of elevated BP reading today. Denies any chest pain or shortness of breath, will recheck BP at home    Review of Systems   Constitutional: Negative.    Genitourinary: Negative.    Psychiatric/Behavioral: Negative.         Objective   Physical Exam  Constitutional:       Appearance: Normal appearance.   Pulmonary:      Effort: Pulmonary effort is normal.   Neurological:      General: No focal deficit present.      Mental Status: She is alert and oriented to person, place, and time.   Psychiatric:         Mood and Affect: Mood normal.         Behavior: Behavior normal.         Assessment/Plan     Reviewed ASCCP statement from 2024 recommending discontinuation of pap after age 65 even in people exposed to VINCE in utero. Pt. States she is comfortable discontinuing  Has regular care with PCP, will get mammos ordered by PCP going forward and RTO here if has any breast or pelvic concerns       Gisela Sneed, EVIE-AARON, EVIE-CNP, DNP 02/13/25 9:58 AM

## 2025-02-27 ENCOUNTER — APPOINTMENT (OUTPATIENT)
Dept: RADIOLOGY | Facility: HOSPITAL | Age: 75
End: 2025-02-27
Payer: MEDICARE

## 2025-03-05 ENCOUNTER — HOSPITAL ENCOUNTER (OUTPATIENT)
Dept: RADIOLOGY | Facility: HOSPITAL | Age: 75
Discharge: HOME | End: 2025-03-05
Payer: MEDICARE

## 2025-03-05 DIAGNOSIS — Z12.31 ENCOUNTER FOR SCREENING MAMMOGRAM FOR MALIGNANT NEOPLASM OF BREAST: ICD-10-CM

## 2025-03-05 PROCEDURE — 77063 BREAST TOMOSYNTHESIS BI: CPT | Performed by: RADIOLOGY

## 2025-03-05 PROCEDURE — 77067 SCR MAMMO BI INCL CAD: CPT

## 2025-03-05 PROCEDURE — 77067 SCR MAMMO BI INCL CAD: CPT | Performed by: RADIOLOGY

## 2025-03-11 ENCOUNTER — APPOINTMENT (OUTPATIENT)
Age: 75
End: 2025-03-11
Payer: MEDICARE

## 2025-03-11 VITALS
DIASTOLIC BLOOD PRESSURE: 86 MMHG | HEART RATE: 73 BPM | WEIGHT: 214 LBS | BODY MASS INDEX: 44.73 KG/M2 | OXYGEN SATURATION: 97 % | SYSTOLIC BLOOD PRESSURE: 136 MMHG

## 2025-03-11 DIAGNOSIS — F33.1 MAJOR DEPRESSIVE DISORDER, RECURRENT, MODERATE: ICD-10-CM

## 2025-03-11 DIAGNOSIS — I25.10 CORONARY ARTERY DISEASE INVOLVING NATIVE CORONARY ARTERY OF NATIVE HEART WITHOUT ANGINA PECTORIS: Primary | ICD-10-CM

## 2025-03-11 DIAGNOSIS — E78.2 MIXED HYPERLIPIDEMIA: ICD-10-CM

## 2025-03-11 DIAGNOSIS — J40 BRONCHITIS: ICD-10-CM

## 2025-03-11 DIAGNOSIS — I10 BENIGN ESSENTIAL HTN: ICD-10-CM

## 2025-03-11 DIAGNOSIS — Z00.00 ROUTINE GENERAL MEDICAL EXAMINATION AT HEALTH CARE FACILITY: ICD-10-CM

## 2025-03-11 PROCEDURE — 3079F DIAST BP 80-89 MM HG: CPT | Performed by: FAMILY MEDICINE

## 2025-03-11 PROCEDURE — 99214 OFFICE O/P EST MOD 30 MIN: CPT | Performed by: FAMILY MEDICINE

## 2025-03-11 PROCEDURE — G2211 COMPLEX E/M VISIT ADD ON: HCPCS | Performed by: FAMILY MEDICINE

## 2025-03-11 PROCEDURE — 1160F RVW MEDS BY RX/DR IN RCRD: CPT | Performed by: FAMILY MEDICINE

## 2025-03-11 PROCEDURE — 3075F SYST BP GE 130 - 139MM HG: CPT | Performed by: FAMILY MEDICINE

## 2025-03-11 PROCEDURE — 1159F MED LIST DOCD IN RCRD: CPT | Performed by: FAMILY MEDICINE

## 2025-03-11 PROCEDURE — 1036F TOBACCO NON-USER: CPT | Performed by: FAMILY MEDICINE

## 2025-03-11 RX ORDER — BUPROPION HYDROCHLORIDE 150 MG/1
150 TABLET, EXTENDED RELEASE ORAL DAILY
COMMUNITY

## 2025-03-11 NOTE — PROGRESS NOTES
Subjective   Patient ID: Katrin Garcia is a 74 y.o. female who presents for Follow-up (6 mo).    HPI   Since the last office visit there have been no interval illnesses or injuries.  Left knee replacement 2 months ago with Dr. Santos, uncomplicated postoperative period, off opioids. No Tylenol. Finished with PT, still using cane.    HTN-Takes and tolerates meds without side effects. No alcohol. no tobacco. no exercise, although now has exercise bike but seldomly uses. low salt.  Reviewed recommendation for 150 minutes of exercise per week including 2 days of weight training if over age 50.   Home BP normally 120s/70s    CAD-nontg, had echo and EKG pre op    Hyperlipidemia- is on a statin and a prudent diet.    Thyroid - Endocrinology increased synthroid from 75 to 88mcg    Mood - in counseling (Dr. Anderson in [place]), on wellbutrin and fluoxetine, feels like she is improving but still struggles with motivation.    GERD-Takes PPI daily with seldom breakthrough symptoms.  Reviewed dietary, caffeine, tobacco, alcohol, and NSAID avoidance. No dyspepsia, dysphagia, reflux, melena, or abdominal pain.      Review of Systems  General-no fatigue weight to within 10 pounds  ENT no problems with vision swallowing  Cardiac no chest pains palpitations change in exercise tolerance or capacity  Pulmonary no cough shortness of breath  GI no heartburn or abdominal pain  Musculoskeletal no joint pains  Objective   /70   Pulse 73   Wt 97.1 kg (214 lb)   SpO2 97%   BMI 44.73 kg/m²     Physical Exam  General:  Alert, No acute distress. Appears stated age  Eye:  Pupils are equal, round and reactive to light, Extraocular movements are intact, Normal conjunctiva.    Neck:  Supple, Non-tender, No carotid bruit, No jugular venous distention, No lymphadenopathy, No thyromegaly.    Respiratory:  Lungs are clear to auscultation, Respirations are non-labored, Breath sounds are equal.    Cardiovascular:  Normal rate, Regular rhythm,  No murmur.    Gastrointestinal:  Soft, Non-tender, No organomegaly. No solid or pulsatile mass  Integumentary:  Warm, Dry. No concerning lesions on exposed areas  Neurologic:  Alert, Oriented.  Gross and fine motor intact, CN 2-12 intact  Psychiatric:  Cooperative, Appropriate mood & affect.  Assessment/Plan   Problem List Items Addressed This Visit             ICD-10-CM    Benign essential HTN I10    Relevant Orders    Follow Up In Primary Care    Hyperlipidemia E78.5    Relevant Orders    Follow Up In Primary Care     Other Visit Diagnoses         Codes    Coronary artery disease involving native coronary artery of native heart without angina pectoris    -  Primary I25.10    Relevant Orders    Follow Up In Primary Care    Bronchitis     J40    Relevant Orders    Follow Up In Primary Care    Routine general medical examination at health care facility     Z00.00    Relevant Orders    Follow Up In Primary Care

## 2025-03-13 ENCOUNTER — TELEPHONE (OUTPATIENT)
Facility: CLINIC | Age: 75
End: 2025-03-13
Payer: MEDICARE

## 2025-08-07 DIAGNOSIS — I10 BENIGN ESSENTIAL HTN: ICD-10-CM

## 2025-08-07 RX ORDER — ATENOLOL 50 MG/1
50 TABLET ORAL DAILY
Qty: 90 TABLET | Refills: 3 | Status: SHIPPED | OUTPATIENT
Start: 2025-08-07 | End: 2026-08-07

## 2025-09-05 ENCOUNTER — APPOINTMENT (OUTPATIENT)
Dept: SLEEP MEDICINE | Facility: CLINIC | Age: 75
End: 2025-09-05
Payer: MEDICARE

## 2025-09-09 ENCOUNTER — APPOINTMENT (OUTPATIENT)
Age: 75
End: 2025-09-09
Payer: MEDICARE

## 2025-12-02 ENCOUNTER — APPOINTMENT (OUTPATIENT)
Dept: CARDIOLOGY | Facility: CLINIC | Age: 75
End: 2025-12-02
Payer: MEDICARE